# Patient Record
Sex: MALE | Race: WHITE
[De-identification: names, ages, dates, MRNs, and addresses within clinical notes are randomized per-mention and may not be internally consistent; named-entity substitution may affect disease eponyms.]

---

## 2024-09-13 ENCOUNTER — HOSPITAL ENCOUNTER (OUTPATIENT)
Dept: HOSPITAL 101 - LAB | Age: 69
LOS: 109 days | Discharge: HOME | End: 2024-12-31
Payer: MEDICARE

## 2024-09-13 DIAGNOSIS — I60.9: Primary | ICD-10-CM

## 2024-09-13 DIAGNOSIS — R41.82: ICD-10-CM

## 2024-09-13 LAB
ADD MANUAL DIFF: NO
ALANINE AMINOTRANSFERASE: 26 U/L (ref 16–63)
ALBUMIN GLOBULIN RATIO: 1.1
ALBUMIN LEVEL: 3.4 G/DL (ref 3.4–5)
ALKALINE PHOSPHATASE: 107 U/L (ref 46–116)
ANION GAP: 13.1
ASPARTATE AMINO TRANSFERASE: 12 U/L (ref 15–37)
BLOOD UREA NITROGEN: 15 MG/DL (ref 7–18)
CALCIUM: 9 MG/DL (ref 8.5–10.1)
CARBON DIOXIDE: 27.6 MMOL/L (ref 21–32)
CHLORIDE: 98 MMOL/L (ref 98–107)
CO2 BLD-SCNC: 27.6 MMOL/L (ref 21–32)
ESTIMATED GFR (AFRICAN AMERICA: >60 (ref 60–?)
ESTIMATED GFR (NON-AFRICAN AME: >60 (ref 60–?)
GLOBULIN: 3.1 G/DL
GLUCOSE BLD-MCNC: 186 MG/DL (ref 74–106)
HCT VFR BLD CALC: 35.6 % (ref 42–54)
HEMATOCRIT: 35.6 % (ref 42–54)
HEMOGLOBIN: 11.6 G/DL (ref 14–18)
IMMATURE GRANULOCYTES ABS AUTO: 0.01 10^3/UL (ref 0–0.03)
IMMATURE GRANULOCYTES PCT AUTO: 0.2 % (ref 0–0.5)
LYMPHOCYTES  ABSOLUTE AUTO: 1.1 10^3/UL (ref 1.2–3.8)
MCV RBC: 80.5 FL (ref 80–94)
MEAN CORPUSCULAR HEMOGLOBIN: 26.2 PG (ref 25.9–34)
MEAN CORPUSCULAR HGB CONC: 32.6 G/DL (ref 29.9–35.2)
MEAN CORPUSCULAR VOLUME: 80.5 FL (ref 80–94)
PLATELET # BLD: 252 10^3/UL (ref 150–450)
PLATELET COUNT: 252 10^3/UL (ref 150–450)
POTASSIUM SERPLBLD-SCNC: 3.7 MMOL/L (ref 3.5–5.1)
POTASSIUM: 3.7 MMOL/L (ref 3.5–5.1)
RED BLOOD COUNT: 4.42 10^6/UL (ref 4.7–6.1)
SODIUM BLD-SCNC: 135 MMOL/L (ref 136–145)
SODIUM: 135 MMOL/L (ref 136–145)
TOTAL PROTEIN: 6.5 G/DL (ref 6.4–8.2)
WBC # BLD: 4.9 10^3/UL (ref 4–11)
WHITE BLOOD COUNT: 4.9 10^3/UL (ref 4–11)

## 2024-09-13 PROCEDURE — 80053 COMPREHEN METABOLIC PANEL: CPT

## 2024-09-13 PROCEDURE — 85025 COMPLETE CBC W/AUTO DIFF WBC: CPT

## 2024-09-13 PROCEDURE — 36415 COLL VENOUS BLD VENIPUNCTURE: CPT

## 2024-09-18 ENCOUNTER — HOSPITAL ENCOUNTER
Dept: HOSPITAL 101 - CARD | Age: 69
Discharge: HOME | End: 2024-09-18
Payer: MEDICARE

## 2024-09-18 DIAGNOSIS — R55: Primary | ICD-10-CM

## 2024-09-18 PROCEDURE — 93246 EXT ECG>7D<15D RECORDING: CPT

## 2024-10-17 ENCOUNTER — HOSPITAL ENCOUNTER
Dept: HOSPITAL 101 - CARD | Age: 69
Discharge: HOME | End: 2024-10-17
Payer: MEDICARE

## 2024-10-17 DIAGNOSIS — R01.1: ICD-10-CM

## 2024-10-17 DIAGNOSIS — I47.29: Primary | ICD-10-CM

## 2024-10-17 DIAGNOSIS — I63.9: ICD-10-CM

## 2024-10-17 PROCEDURE — 93306 TTE W/DOPPLER COMPLETE: CPT

## 2024-12-03 ENCOUNTER — HOSPITAL ENCOUNTER (OUTPATIENT)
Dept: HOSPITAL 101 - ER | Age: 69
Setting detail: OBSERVATION
LOS: 2 days | Discharge: NURSING, REHAB OR LONG-TERM CARE | End: 2024-12-05
Payer: MEDICARE

## 2024-12-03 VITALS
OXYGEN SATURATION: 97 % | SYSTOLIC BLOOD PRESSURE: 138 MMHG | DIASTOLIC BLOOD PRESSURE: 76 MMHG | HEART RATE: 73 BPM | TEMPERATURE: 97.9 F

## 2024-12-03 VITALS — BODY MASS INDEX: 28.2 KG/M2 | BODY MASS INDEX: 25.8 KG/M2

## 2024-12-03 DIAGNOSIS — Z79.4: ICD-10-CM

## 2024-12-03 DIAGNOSIS — Z74.01: ICD-10-CM

## 2024-12-03 DIAGNOSIS — Z79.82: ICD-10-CM

## 2024-12-03 DIAGNOSIS — Z87.891: ICD-10-CM

## 2024-12-03 DIAGNOSIS — I10: ICD-10-CM

## 2024-12-03 DIAGNOSIS — Z96.0: ICD-10-CM

## 2024-12-03 DIAGNOSIS — Z79.899: ICD-10-CM

## 2024-12-03 DIAGNOSIS — R31.0: Primary | ICD-10-CM

## 2024-12-03 DIAGNOSIS — E78.5: ICD-10-CM

## 2024-12-03 DIAGNOSIS — G81.94: ICD-10-CM

## 2024-12-03 DIAGNOSIS — N40.1: ICD-10-CM

## 2024-12-03 DIAGNOSIS — R33.8: ICD-10-CM

## 2024-12-03 DIAGNOSIS — I66.09: ICD-10-CM

## 2024-12-03 DIAGNOSIS — E11.9: ICD-10-CM

## 2024-12-03 PROCEDURE — 96366 THER/PROPH/DIAG IV INF ADDON: CPT

## 2024-12-03 PROCEDURE — 74176 CT ABD & PELVIS W/O CONTRAST: CPT

## 2024-12-03 PROCEDURE — 96367 TX/PROPH/DG ADDL SEQ IV INF: CPT

## 2024-12-03 PROCEDURE — 85610 PROTHROMBIN TIME: CPT

## 2024-12-03 PROCEDURE — 85025 COMPLETE CBC W/AUTO DIFF WBC: CPT

## 2024-12-03 PROCEDURE — G0378 HOSPITAL OBSERVATION PER HR: HCPCS

## 2024-12-03 PROCEDURE — 86850 RBC ANTIBODY SCREEN: CPT

## 2024-12-03 PROCEDURE — 96365 THER/PROPH/DIAG IV INF INIT: CPT

## 2024-12-03 PROCEDURE — 99285 EMERGENCY DEPT VISIT HI MDM: CPT

## 2024-12-03 PROCEDURE — 36415 COLL VENOUS BLD VENIPUNCTURE: CPT

## 2024-12-03 PROCEDURE — 94761 N-INVAS EAR/PLS OXIMETRY MLT: CPT

## 2024-12-03 PROCEDURE — 82948 REAGENT STRIP/BLOOD GLUCOSE: CPT

## 2024-12-03 PROCEDURE — 80053 COMPREHEN METABOLIC PANEL: CPT

## 2024-12-03 PROCEDURE — 96376 TX/PRO/DX INJ SAME DRUG ADON: CPT

## 2024-12-03 PROCEDURE — 86901 BLOOD TYPING SEROLOGIC RH(D): CPT

## 2024-12-03 PROCEDURE — 85014 HEMATOCRIT: CPT

## 2024-12-03 PROCEDURE — 86900 BLOOD TYPING SEROLOGIC ABO: CPT

## 2024-12-03 PROCEDURE — 51702 INSERT TEMP BLADDER CATH: CPT

## 2024-12-03 PROCEDURE — 85018 HEMOGLOBIN: CPT

## 2024-12-03 RX ADMIN — SODIUM CHLORIDE 3000 ML: 900 IRRIGANT IRRIGATION at 23:53

## 2024-12-04 VITALS — HEART RATE: 78 BPM

## 2024-12-04 VITALS — SYSTOLIC BLOOD PRESSURE: 127 MMHG | DIASTOLIC BLOOD PRESSURE: 67 MMHG

## 2024-12-04 VITALS
OXYGEN SATURATION: 95 % | TEMPERATURE: 97.34 F | SYSTOLIC BLOOD PRESSURE: 120 MMHG | DIASTOLIC BLOOD PRESSURE: 71 MMHG | HEART RATE: 89 BPM

## 2024-12-04 VITALS — OXYGEN SATURATION: 97 %

## 2024-12-04 VITALS — OXYGEN SATURATION: 96 %

## 2024-12-04 VITALS
OXYGEN SATURATION: 92 % | SYSTOLIC BLOOD PRESSURE: 155 MMHG | HEART RATE: 70 BPM | TEMPERATURE: 98.1 F | DIASTOLIC BLOOD PRESSURE: 85 MMHG

## 2024-12-04 VITALS — SYSTOLIC BLOOD PRESSURE: 105 MMHG | DIASTOLIC BLOOD PRESSURE: 53 MMHG

## 2024-12-04 VITALS — SYSTOLIC BLOOD PRESSURE: 112 MMHG | DIASTOLIC BLOOD PRESSURE: 57 MMHG

## 2024-12-04 VITALS — DIASTOLIC BLOOD PRESSURE: 68 MMHG | SYSTOLIC BLOOD PRESSURE: 127 MMHG

## 2024-12-04 VITALS — DIASTOLIC BLOOD PRESSURE: 83 MMHG | SYSTOLIC BLOOD PRESSURE: 159 MMHG

## 2024-12-04 VITALS — SYSTOLIC BLOOD PRESSURE: 112 MMHG | DIASTOLIC BLOOD PRESSURE: 53 MMHG

## 2024-12-04 VITALS — HEART RATE: 91 BPM

## 2024-12-04 VITALS — DIASTOLIC BLOOD PRESSURE: 77 MMHG | SYSTOLIC BLOOD PRESSURE: 142 MMHG

## 2024-12-04 VITALS
DIASTOLIC BLOOD PRESSURE: 85 MMHG | HEART RATE: 70 BPM | TEMPERATURE: 98.1 F | OXYGEN SATURATION: 92 % | SYSTOLIC BLOOD PRESSURE: 155 MMHG

## 2024-12-04 VITALS — OXYGEN SATURATION: 95 %

## 2024-12-04 VITALS — DIASTOLIC BLOOD PRESSURE: 62 MMHG | SYSTOLIC BLOOD PRESSURE: 115 MMHG

## 2024-12-04 VITALS — DIASTOLIC BLOOD PRESSURE: 65 MMHG | SYSTOLIC BLOOD PRESSURE: 124 MMHG

## 2024-12-04 VITALS — OXYGEN SATURATION: 98 %

## 2024-12-04 VITALS — OXYGEN SATURATION: 93 %

## 2024-12-04 VITALS — SYSTOLIC BLOOD PRESSURE: 105 MMHG | DIASTOLIC BLOOD PRESSURE: 55 MMHG

## 2024-12-04 VITALS
TEMPERATURE: 99.14 F | DIASTOLIC BLOOD PRESSURE: 86 MMHG | SYSTOLIC BLOOD PRESSURE: 135 MMHG | OXYGEN SATURATION: 90 % | HEART RATE: 83 BPM

## 2024-12-04 VITALS — SYSTOLIC BLOOD PRESSURE: 102 MMHG | DIASTOLIC BLOOD PRESSURE: 60 MMHG

## 2024-12-04 VITALS — SYSTOLIC BLOOD PRESSURE: 108 MMHG | DIASTOLIC BLOOD PRESSURE: 57 MMHG

## 2024-12-04 VITALS — HEART RATE: 100 BPM

## 2024-12-04 VITALS — OXYGEN SATURATION: 94 %

## 2024-12-04 VITALS — DIASTOLIC BLOOD PRESSURE: 75 MMHG | SYSTOLIC BLOOD PRESSURE: 113 MMHG | HEART RATE: 88 BPM | OXYGEN SATURATION: 97 %

## 2024-12-04 VITALS — SYSTOLIC BLOOD PRESSURE: 119 MMHG | DIASTOLIC BLOOD PRESSURE: 72 MMHG

## 2024-12-04 VITALS — OXYGEN SATURATION: 99 %

## 2024-12-04 VITALS — HEART RATE: 102 BPM

## 2024-12-04 LAB
ADD MANUAL DIFF: NO
ALANINE AMINOTRANSFERASE: 18 U/L (ref 16–63)
ALBUMIN GLOBULIN RATIO: 1.2
ALBUMIN LEVEL: 3.6 G/DL (ref 3.4–5)
ALKALINE PHOSPHATASE: 74 U/L (ref 46–116)
ANION GAP: 11.7
ASPARTATE AMINO TRANSFERASE: 9 U/L (ref 15–37)
BLOOD UREA NITROGEN: 23 MG/DL (ref 7–18)
CALCIUM: 9.6 MG/DL (ref 8.5–10.1)
CARBON DIOXIDE: 29.6 MMOL/L (ref 21–32)
CHLORIDE: 101 MMOL/L (ref 98–107)
CO2 BLD-SCNC: 29.6 MMOL/L (ref 21–32)
ESTIMATED GFR (AFRICAN AMERICA: >60
ESTIMATED GFR (NON-AFRICAN AME: >60
GLOBULIN: 3.1 G/DL
GLUCOSE BLD-MCNC: 160 MG/DL (ref 74–106)
HCT VFR BLD CALC: 36.2 % (ref 42–54)
HCT VFR BLD CALC: 36.6 % (ref 42–54)
HEMATOCRIT: 36.2 % (ref 42–54)
HEMATOCRIT: 36.6 % (ref 42–54)
HEMOGLOBIN: 12 G/DL (ref 14–18)
HEMOGLOBIN: 12 G/DL (ref 14–18)
IMMATURE GRANULOCYTES ABS AUTO: 0.02 10^3/UL (ref 0–0.03)
IMMATURE GRANULOCYTES PCT AUTO: 0.3 % (ref 0–0.5)
INR: 1.09
LYMPHOCYTES  ABSOLUTE AUTO: 1.2 10^3/UL (ref 1.2–3.8)
MCV RBC: 83.8 FL (ref 80–94)
MEAN CORPUSCULAR HEMOGLOBIN: 27.8 PG (ref 25.9–34)
MEAN CORPUSCULAR HGB CONC: 33.1 G/DL (ref 29.9–35.2)
MEAN CORPUSCULAR VOLUME: 83.8 FL (ref 80–94)
PLATELET # BLD: 245 10^3/UL (ref 150–450)
PLATELET COUNT: 245 10^3/UL (ref 150–450)
POTASSIUM SERPLBLD-SCNC: 4.3 MMOL/L (ref 3.5–5.1)
POTASSIUM: 4.3 MMOL/L (ref 3.5–5.1)
PROTHROMBIN TIME: 11.5 SEC (ref 9–11.6)
RED BLOOD COUNT: 4.32 10^6/UL (ref 4.7–6.1)
SODIUM BLD-SCNC: 138 MMOL/L (ref 136–145)
SODIUM: 138 MMOL/L (ref 136–145)
TOTAL PROTEIN: 6.7 G/DL (ref 6.4–8.2)
WBC # BLD: 6.7 10^3/UL (ref 4–11)
WHITE BLOOD COUNT: 6.7 10^3/UL (ref 4–11)

## 2024-12-04 RX ADMIN — SODIUM CHLORIDE 3000 ML: 900 IRRIGANT IRRIGATION at 15:26

## 2024-12-04 RX ADMIN — GABAPENTIN 100 MG: 100 CAPSULE ORAL at 21:20

## 2024-12-04 RX ADMIN — TAMSULOSIN HYDROCHLORIDE 0.4 MG: 0.4 CAPSULE ORAL at 21:18

## 2024-12-04 RX ADMIN — SODIUM CHLORIDE 3000 ML: 900 IRRIGANT IRRIGATION at 09:00

## 2024-12-04 RX ADMIN — SODIUM CHLORIDE 3000 ML: 900 IRRIGANT IRRIGATION at 12:32

## 2024-12-04 RX ADMIN — INSULIN ASPART 0 UNIT: 100 INJECTION, SOLUTION INTRAVENOUS; SUBCUTANEOUS at 21:39

## 2024-12-04 RX ADMIN — LEVOFLOXACIN 100 MG: 5 INJECTION, SOLUTION INTRAVENOUS at 12:38

## 2024-12-04 RX ADMIN — SODIUM CHLORIDE 3000 ML: 900 IRRIGANT IRRIGATION at 17:19

## 2024-12-04 RX ADMIN — TEMAZEPAM 15 MG: 15 CAPSULE ORAL at 21:21

## 2024-12-04 RX ADMIN — SODIUM CHLORIDE 3000 ML: 900 IRRIGANT IRRIGATION at 01:45

## 2024-12-04 RX ADMIN — GABAPENTIN 100 MG: 100 CAPSULE ORAL at 14:24

## 2024-12-04 RX ADMIN — SODIUM CHLORIDE 3000 ML: 900 IRRIGANT IRRIGATION at 05:31

## 2024-12-04 RX ADMIN — SODIUM CHLORIDE 3000 ML: 900 IRRIGANT IRRIGATION at 15:22

## 2024-12-04 RX ADMIN — HYDROCODONE BITARTRATE AND ACETAMINOPHEN 1 TAB: 5; 325 TABLET ORAL at 01:45

## 2024-12-04 RX ADMIN — SODIUM CHLORIDE 3000 ML: 900 IRRIGANT IRRIGATION at 14:34

## 2024-12-04 RX ADMIN — SODIUM CHLORIDE 3000 ML: 900 IRRIGANT IRRIGATION at 11:27

## 2024-12-04 RX ADMIN — ACETAMINOPHEN 650 MG: 325 TABLET ORAL at 11:02

## 2024-12-04 RX ADMIN — LIDOCAINE HYDROCHLORIDE 10 ML: 20 JELLY TOPICAL at 09:21

## 2024-12-04 RX ADMIN — SODIUM CHLORIDE 3000 ML: 900 IRRIGANT IRRIGATION at 22:33

## 2024-12-04 RX ADMIN — SODIUM CHLORIDE 3000 ML: 900 IRRIGANT IRRIGATION at 00:35

## 2024-12-04 RX ADMIN — INSULIN GLARGINE 15 UNIT: 100 INJECTION, SOLUTION SUBCUTANEOUS at 21:39

## 2024-12-04 RX ADMIN — SODIUM CHLORIDE 3000 ML: 900 IRRIGANT IRRIGATION at 03:00

## 2024-12-04 RX ADMIN — SODIUM CHLORIDE 3000 ML: 900 IRRIGANT IRRIGATION at 16:19

## 2024-12-04 RX ADMIN — SODIUM CHLORIDE 3000 ML: 900 IRRIGANT IRRIGATION at 04:41

## 2024-12-04 RX ADMIN — TRAZODONE HYDROCHLORIDE 25 MG: 50 TABLET ORAL at 21:18

## 2024-12-04 RX ADMIN — SENNOSIDES, DOCUSATE SODIUM 17.2 MG: 8.6; 5 TABLET ORAL at 21:19

## 2024-12-04 RX ADMIN — SODIUM CHLORIDE 3000 ML: 900 IRRIGANT IRRIGATION at 03:38

## 2024-12-04 RX ADMIN — SODIUM CHLORIDE 3000 ML: 900 IRRIGANT IRRIGATION at 08:15

## 2024-12-04 RX ADMIN — ONDANSETRON 4 MG: 4 TABLET, ORALLY DISINTEGRATING ORAL at 01:45

## 2024-12-04 RX ADMIN — SODIUM CHLORIDE 3000 ML: 900 IRRIGANT IRRIGATION at 21:56

## 2024-12-04 RX ADMIN — SODIUM CHLORIDE 3000 ML: 900 IRRIGANT IRRIGATION at 14:23

## 2024-12-04 RX ADMIN — ATORVASTATIN CALCIUM 40 MG: 40 TABLET, FILM COATED ORAL at 21:11

## 2024-12-05 VITALS
OXYGEN SATURATION: 93 % | SYSTOLIC BLOOD PRESSURE: 111 MMHG | HEART RATE: 77 BPM | DIASTOLIC BLOOD PRESSURE: 73 MMHG | TEMPERATURE: 97.6 F

## 2024-12-05 VITALS — OXYGEN SATURATION: 98 %

## 2024-12-05 VITALS
HEART RATE: 92 BPM | TEMPERATURE: 97.7 F | OXYGEN SATURATION: 90 % | SYSTOLIC BLOOD PRESSURE: 115 MMHG | DIASTOLIC BLOOD PRESSURE: 66 MMHG

## 2024-12-05 VITALS — HEART RATE: 61 BPM

## 2024-12-05 VITALS
SYSTOLIC BLOOD PRESSURE: 109 MMHG | TEMPERATURE: 97.88 F | DIASTOLIC BLOOD PRESSURE: 69 MMHG | OXYGEN SATURATION: 91 % | HEART RATE: 76 BPM

## 2024-12-05 VITALS — HEART RATE: 84 BPM

## 2024-12-05 VITALS — HEART RATE: 88 BPM

## 2024-12-05 VITALS — HEART RATE: 81 BPM

## 2024-12-05 VITALS — OXYGEN SATURATION: 95 %

## 2024-12-05 VITALS — HEART RATE: 74 BPM

## 2024-12-05 VITALS — HEART RATE: 71 BPM

## 2024-12-05 LAB
ADD MANUAL DIFF: NO
ALANINE AMINOTRANSFERASE: 15 U/L (ref 16–63)
ALBUMIN GLOBULIN RATIO: 1.2
ALBUMIN LEVEL: 3.3 G/DL (ref 3.4–5)
ALKALINE PHOSPHATASE: 72 U/L (ref 46–116)
ANION GAP: 12.6
ASPARTATE AMINO TRANSFERASE: 6 U/L (ref 15–37)
BLOOD UREA NITROGEN: 22 MG/DL (ref 7–18)
CALCIUM: 8.8 MG/DL (ref 8.5–10.1)
CARBON DIOXIDE: 27.6 MMOL/L (ref 21–32)
CHLORIDE: 105 MMOL/L (ref 98–107)
CO2 BLD-SCNC: 27.6 MMOL/L (ref 21–32)
ESTIMATED GFR (AFRICAN AMERICA: >60
ESTIMATED GFR (NON-AFRICAN AME: >60
GLOBULIN: 2.7 G/DL
GLUCOSE BLD-MCNC: 133 MG/DL (ref 74–106)
HCT VFR BLD CALC: 32.7 % (ref 42–54)
HEMATOCRIT: 32.7 % (ref 42–54)
HEMOGLOBIN: 10.8 G/DL (ref 14–18)
IMMATURE GRANULOCYTES ABS AUTO: 0.01 10^3/UL (ref 0–0.03)
IMMATURE GRANULOCYTES PCT AUTO: 0.2 % (ref 0–0.5)
LYMPHOCYTES  ABSOLUTE AUTO: 1.7 10^3/UL (ref 1.2–3.8)
MCV RBC: 84.7 FL (ref 80–94)
MEAN CORPUSCULAR HEMOGLOBIN: 28 PG (ref 25.9–34)
MEAN CORPUSCULAR HGB CONC: 33 G/DL (ref 29.9–35.2)
MEAN CORPUSCULAR VOLUME: 84.7 FL (ref 80–94)
PLATELET # BLD: 245 10^3/UL (ref 150–450)
PLATELET COUNT: 245 10^3/UL (ref 150–450)
POTASSIUM SERPLBLD-SCNC: 4.2 MMOL/L (ref 3.5–5.1)
POTASSIUM: 4.2 MMOL/L (ref 3.5–5.1)
RED BLOOD COUNT: 3.86 10^6/UL (ref 4.7–6.1)
SODIUM BLD-SCNC: 141 MMOL/L (ref 136–145)
SODIUM: 141 MMOL/L (ref 136–145)
TOTAL PROTEIN: 6 G/DL (ref 6.4–8.2)
WBC # BLD: 5 10^3/UL (ref 4–11)
WHITE BLOOD COUNT: 5 10^3/UL (ref 4–11)

## 2024-12-05 RX ADMIN — POLYETHYLENE GLYCOL 3350 17 GM: 17 POWDER, FOR SOLUTION ORAL at 10:48

## 2024-12-05 RX ADMIN — SODIUM CHLORIDE 3000 ML: 900 IRRIGANT IRRIGATION at 01:31

## 2024-12-05 RX ADMIN — GABAPENTIN 100 MG: 100 CAPSULE ORAL at 14:56

## 2024-12-05 RX ADMIN — Medication 400 MG: at 10:47

## 2024-12-05 RX ADMIN — ACETAMINOPHEN 650 MG: 325 TABLET ORAL at 12:09

## 2024-12-05 RX ADMIN — SODIUM CHLORIDE 3000 ML: 900 IRRIGANT IRRIGATION at 01:24

## 2024-12-05 RX ADMIN — INSULIN ASPART 0 UNIT: 100 INJECTION, SOLUTION INTRAVENOUS; SUBCUTANEOUS at 18:38

## 2024-12-05 RX ADMIN — METOPROLOL SUCCINATE 50 MG: 50 TABLET, EXTENDED RELEASE ORAL at 10:47

## 2024-12-05 RX ADMIN — INSULIN ASPART 0 UNIT: 100 INJECTION, SOLUTION INTRAVENOUS; SUBCUTANEOUS at 12:10

## 2024-12-05 RX ADMIN — SENNOSIDES, DOCUSATE SODIUM 1 TAB: 8.6; 5 TABLET ORAL at 10:48

## 2024-12-05 RX ADMIN — ACETAMINOPHEN 650 MG: 325 TABLET ORAL at 18:38

## 2024-12-05 RX ADMIN — SODIUM CHLORIDE 3000 ML: 900 IRRIGANT IRRIGATION at 07:58

## 2024-12-06 ENCOUNTER — HOSPITAL ENCOUNTER (EMERGENCY)
Age: 69
LOS: 1 days | Discharge: ANOTHER HOSPITAL | End: 2024-12-07
Payer: MEDICARE

## 2024-12-06 VITALS — HEART RATE: 76 BPM

## 2024-12-06 VITALS — HEART RATE: 70 BPM

## 2024-12-06 VITALS — HEART RATE: 72 BPM

## 2024-12-06 VITALS — SYSTOLIC BLOOD PRESSURE: 128 MMHG | DIASTOLIC BLOOD PRESSURE: 70 MMHG

## 2024-12-06 VITALS — SYSTOLIC BLOOD PRESSURE: 152 MMHG | HEART RATE: 67 BPM | DIASTOLIC BLOOD PRESSURE: 61 MMHG

## 2024-12-06 VITALS — HEART RATE: 67 BPM

## 2024-12-06 VITALS — HEART RATE: 71 BPM

## 2024-12-06 VITALS — HEART RATE: 77 BPM

## 2024-12-06 VITALS — HEART RATE: 74 BPM

## 2024-12-06 VITALS
DIASTOLIC BLOOD PRESSURE: 70 MMHG | SYSTOLIC BLOOD PRESSURE: 128 MMHG | HEART RATE: 63 BPM | OXYGEN SATURATION: 97 % | TEMPERATURE: 98.24 F

## 2024-12-06 VITALS — DIASTOLIC BLOOD PRESSURE: 63 MMHG | SYSTOLIC BLOOD PRESSURE: 132 MMHG | HEART RATE: 67 BPM

## 2024-12-06 VITALS — HEART RATE: 65 BPM

## 2024-12-06 VITALS — HEART RATE: 64 BPM

## 2024-12-06 VITALS — BODY MASS INDEX: 27.1 KG/M2

## 2024-12-06 VITALS — HEART RATE: 73 BPM

## 2024-12-06 VITALS — HEART RATE: 61 BPM

## 2024-12-06 DIAGNOSIS — M54.89: ICD-10-CM

## 2024-12-06 DIAGNOSIS — Z96.652: ICD-10-CM

## 2024-12-06 DIAGNOSIS — Z86.73: ICD-10-CM

## 2024-12-06 DIAGNOSIS — Z87.891: ICD-10-CM

## 2024-12-06 DIAGNOSIS — R31.0: Primary | ICD-10-CM

## 2024-12-06 LAB
ADD MANUAL DIFF: NO
ANION GAP: 11.3
BLOOD UREA NITROGEN: 23 MG/DL (ref 7–18)
CALCIUM: 9.2 MG/DL (ref 8.5–10.1)
CARBON DIOXIDE: 29.7 MMOL/L (ref 21–32)
CHLORIDE: 102 MMOL/L (ref 98–107)
CO2 BLD-SCNC: 29.7 MMOL/L (ref 21–32)
ESTIMATED GFR (AFRICAN AMERICA: >60
ESTIMATED GFR (NON-AFRICAN AME: >60
GLUCOSE BLD-MCNC: 167 MG/DL (ref 74–106)
HCT VFR BLD CALC: 33.4 % (ref 42–54)
HEMATOCRIT: 33.4 % (ref 42–54)
HEMOGLOBIN: 10.8 G/DL (ref 14–18)
IMMATURE GRANULOCYTES ABS AUTO: 0.01 10^3/UL (ref 0–0.03)
IMMATURE GRANULOCYTES PCT AUTO: 0.2 % (ref 0–0.5)
LYMPHOCYTES  ABSOLUTE AUTO: 1.8 10^3/UL (ref 1.2–3.8)
MCV RBC: 85 FL (ref 80–94)
MEAN CORPUSCULAR HEMOGLOBIN: 27.5 PG (ref 25.9–34)
MEAN CORPUSCULAR HGB CONC: 32.3 G/DL (ref 29.9–35.2)
MEAN CORPUSCULAR VOLUME: 85 FL (ref 80–94)
PLATELET # BLD: 264 10^3/UL (ref 150–450)
PLATELET COUNT: 264 10^3/UL (ref 150–450)
POTASSIUM SERPLBLD-SCNC: 4 MMOL/L (ref 3.5–5.1)
POTASSIUM: 4 MMOL/L (ref 3.5–5.1)
RED BLOOD COUNT: 3.93 10^6/UL (ref 4.7–6.1)
SODIUM BLD-SCNC: 139 MMOL/L (ref 136–145)
SODIUM: 139 MMOL/L (ref 136–145)
WBC # BLD: 5 10^3/UL (ref 4–11)
WHITE BLOOD COUNT: 5 10^3/UL (ref 4–11)

## 2024-12-06 PROCEDURE — 36415 COLL VENOUS BLD VENIPUNCTURE: CPT

## 2024-12-06 PROCEDURE — 93005 ELECTROCARDIOGRAM TRACING: CPT

## 2024-12-06 PROCEDURE — 84484 ASSAY OF TROPONIN QUANT: CPT

## 2024-12-06 PROCEDURE — 99285 EMERGENCY DEPT VISIT HI MDM: CPT

## 2024-12-06 PROCEDURE — 85025 COMPLETE CBC W/AUTO DIFF WBC: CPT

## 2024-12-06 PROCEDURE — 80048 BASIC METABOLIC PNL TOTAL CA: CPT

## 2024-12-06 PROCEDURE — 96374 THER/PROPH/DIAG INJ IV PUSH: CPT

## 2024-12-07 ENCOUNTER — HOSPITAL ENCOUNTER (INPATIENT)
Facility: HOSPITAL | Age: 69
End: 2024-12-07
Attending: INTERNAL MEDICINE | Admitting: INTERNAL MEDICINE

## 2024-12-07 VITALS — HEART RATE: 71 BPM

## 2024-12-07 VITALS — HEART RATE: 70 BPM

## 2024-12-07 VITALS — HEART RATE: 62 BPM

## 2024-12-07 VITALS — HEART RATE: 64 BPM

## 2024-12-07 VITALS — HEART RATE: 65 BPM

## 2024-12-07 VITALS — HEART RATE: 67 BPM

## 2024-12-07 VITALS — HEART RATE: 73 BPM

## 2024-12-07 VITALS — HEART RATE: 84 BPM

## 2024-12-07 DIAGNOSIS — R31.9 HEMATURIA: Primary | ICD-10-CM

## 2024-12-07 DIAGNOSIS — Z72.0 TOBACCO USE: ICD-10-CM

## 2024-12-07 DIAGNOSIS — Z97.8 CHRONIC INDWELLING FOLEY CATHETER: ICD-10-CM

## 2024-12-07 PROBLEM — E87.1 HYPONATREMIA: Status: ACTIVE | Noted: 2024-12-07

## 2024-12-07 PROBLEM — K59.00 CONSTIPATION: Status: ACTIVE | Noted: 2024-12-07

## 2024-12-07 LAB
ALBUMIN SERPL BCP-MCNC: 4 G/DL (ref 3.4–5)
ALP SERPL-CCNC: 60 U/L (ref 33–136)
ALT SERPL W P-5'-P-CCNC: 11 U/L (ref 10–52)
ANION GAP SERPL CALC-SCNC: 12 MMOL/L (ref 10–20)
APPEARANCE UR: CLEAR
APTT PPP: 32 SECONDS (ref 27–38)
AST SERPL W P-5'-P-CCNC: 8 U/L (ref 9–39)
ATRIAL RATE: 55 BPM
BILIRUB SERPL-MCNC: 0.5 MG/DL (ref 0–1.2)
BILIRUB UR STRIP.AUTO-MCNC: NEGATIVE MG/DL
BUN SERPL-MCNC: 20 MG/DL (ref 6–23)
CALCIUM SERPL-MCNC: 9.2 MG/DL (ref 8.6–10.3)
CHLORIDE SERPL-SCNC: 100 MMOL/L (ref 98–107)
CO2 SERPL-SCNC: 27 MMOL/L (ref 21–32)
COLOR UR: COLORLESS
CREAT SERPL-MCNC: 0.46 MG/DL (ref 0.5–1.3)
EGFRCR SERPLBLD CKD-EPI 2021: >90 ML/MIN/1.73M*2
ERYTHROCYTE [DISTWIDTH] IN BLOOD BY AUTOMATED COUNT: 16.2 % (ref 11.5–14.5)
GLUCOSE BLD MANUAL STRIP-MCNC: 160 MG/DL (ref 74–99)
GLUCOSE BLD MANUAL STRIP-MCNC: 173 MG/DL (ref 74–99)
GLUCOSE BLD MANUAL STRIP-MCNC: 179 MG/DL (ref 74–99)
GLUCOSE BLD MANUAL STRIP-MCNC: 188 MG/DL (ref 74–99)
GLUCOSE BLD MANUAL STRIP-MCNC: 192 MG/DL (ref 74–99)
GLUCOSE SERPL-MCNC: 181 MG/DL (ref 74–99)
GLUCOSE UR STRIP.AUTO-MCNC: NORMAL MG/DL
HCT VFR BLD AUTO: 35.2 % (ref 41–52)
HGB BLD-MCNC: 10.8 G/DL (ref 13.5–17.5)
INR PPP: 1.2 (ref 0.9–1.1)
KETONES UR STRIP.AUTO-MCNC: NEGATIVE MG/DL
LEUKOCYTE ESTERASE UR QL STRIP.AUTO: NEGATIVE
MAGNESIUM SERPL-MCNC: 1.69 MG/DL (ref 1.6–2.4)
MCH RBC QN AUTO: 27 PG (ref 26–34)
MCHC RBC AUTO-ENTMCNC: 30.7 G/DL (ref 32–36)
MCV RBC AUTO: 88 FL (ref 80–100)
NITRITE UR QL STRIP.AUTO: NEGATIVE
NRBC BLD-RTO: 0 /100 WBCS (ref 0–0)
P AXIS: 13 DEGREES
P OFFSET: 203 MS
P ONSET: 141 MS
PH UR STRIP.AUTO: 6.5 [PH]
PHOSPHATE SERPL-MCNC: 3.1 MG/DL (ref 2.5–4.9)
PLATELET # BLD AUTO: 250 X10*3/UL (ref 150–450)
POTASSIUM SERPL-SCNC: 4 MMOL/L (ref 3.5–5.3)
PR INTERVAL: 168 MS
PROT SERPL-MCNC: 6.4 G/DL (ref 6.4–8.2)
PROT UR STRIP.AUTO-MCNC: NEGATIVE MG/DL
PROTHROMBIN TIME: 13.1 SECONDS (ref 9.8–12.8)
Q ONSET: 225 MS
QRS COUNT: 9 BEATS
QRS DURATION: 106 MS
QT INTERVAL: 434 MS
QTC CALCULATION(BAZETT): 415 MS
QTC FREDERICIA: 421 MS
R AXIS: -25 DEGREES
RBC # BLD AUTO: 4 X10*6/UL (ref 4.5–5.9)
RBC # UR STRIP.AUTO: ABNORMAL /UL
RBC #/AREA URNS AUTO: NORMAL /HPF
SODIUM SERPL-SCNC: 135 MMOL/L (ref 136–145)
SP GR UR STRIP.AUTO: 1.01
T AXIS: -13 DEGREES
T OFFSET: 442 MS
UROBILINOGEN UR STRIP.AUTO-MCNC: NORMAL MG/DL
VENTRICULAR RATE: 55 BPM
WBC # BLD AUTO: 4.3 X10*3/UL (ref 4.4–11.3)
WBC #/AREA URNS AUTO: NORMAL /HPF

## 2024-12-07 PROCEDURE — 80053 COMPREHEN METABOLIC PANEL: CPT

## 2024-12-07 PROCEDURE — 2500000005 HC RX 250 GENERAL PHARMACY W/O HCPCS

## 2024-12-07 PROCEDURE — 2500000002 HC RX 250 W HCPCS SELF ADMINISTERED DRUGS (ALT 637 FOR MEDICARE OP, ALT 636 FOR OP/ED)

## 2024-12-07 PROCEDURE — 85027 COMPLETE CBC AUTOMATED: CPT

## 2024-12-07 PROCEDURE — S4991 NICOTINE PATCH NONLEGEND: HCPCS

## 2024-12-07 PROCEDURE — 74176 CT ABD & PELVIS W/O CONTRAST: CPT | Performed by: RADIOLOGY

## 2024-12-07 PROCEDURE — 84075 ASSAY ALKALINE PHOSPHATASE: CPT

## 2024-12-07 PROCEDURE — 99222 1ST HOSP IP/OBS MODERATE 55: CPT

## 2024-12-07 PROCEDURE — 2500000004 HC RX 250 GENERAL PHARMACY W/ HCPCS (ALT 636 FOR OP/ED)

## 2024-12-07 PROCEDURE — 84100 ASSAY OF PHOSPHORUS: CPT

## 2024-12-07 PROCEDURE — 1200000002 HC GENERAL ROOM WITH TELEMETRY DAILY

## 2024-12-07 PROCEDURE — 85610 PROTHROMBIN TIME: CPT

## 2024-12-07 PROCEDURE — 2500000001 HC RX 250 WO HCPCS SELF ADMINISTERED DRUGS (ALT 637 FOR MEDICARE OP)

## 2024-12-07 PROCEDURE — 81001 URINALYSIS AUTO W/SCOPE: CPT

## 2024-12-07 PROCEDURE — 36415 COLL VENOUS BLD VENIPUNCTURE: CPT

## 2024-12-07 PROCEDURE — 2500000005 HC RX 250 GENERAL PHARMACY W/O HCPCS: Performed by: PHYSICIAN ASSISTANT

## 2024-12-07 PROCEDURE — 82947 ASSAY GLUCOSE BLOOD QUANT: CPT

## 2024-12-07 PROCEDURE — 83735 ASSAY OF MAGNESIUM: CPT

## 2024-12-07 RX ORDER — METOPROLOL SUCCINATE 50 MG/1
50 TABLET, EXTENDED RELEASE ORAL DAILY
Status: ON HOLD | COMMUNITY

## 2024-12-07 RX ORDER — METOPROLOL SUCCINATE 50 MG/1
50 TABLET, EXTENDED RELEASE ORAL DAILY
OUTPATIENT
Start: 2024-12-07

## 2024-12-07 RX ORDER — LANOLIN ALCOHOL/MO/W.PET/CERES
400 CREAM (GRAM) TOPICAL DAILY
OUTPATIENT
Start: 2024-12-07

## 2024-12-07 RX ORDER — PANTOPRAZOLE SODIUM 40 MG/1
40 TABLET, DELAYED RELEASE ORAL
Status: DISPENSED | OUTPATIENT
Start: 2024-12-07

## 2024-12-07 RX ORDER — CALCIUM CARBONATE 300MG(750)
400 TABLET,CHEWABLE ORAL DAILY
Status: ON HOLD | COMMUNITY

## 2024-12-07 RX ORDER — GABAPENTIN 100 MG/1
100 CAPSULE ORAL 3 TIMES DAILY
OUTPATIENT
Start: 2024-12-07

## 2024-12-07 RX ORDER — TRAZODONE HYDROCHLORIDE 50 MG/1
25 TABLET ORAL NIGHTLY
Status: ON HOLD | COMMUNITY

## 2024-12-07 RX ORDER — SENNOSIDES 8.6 MG/1
2 TABLET ORAL DAILY
Status: DISPENSED | OUTPATIENT
Start: 2024-12-07

## 2024-12-07 RX ORDER — DEXTROSE 50 % IN WATER (D50W) INTRAVENOUS SYRINGE
12.5
Status: ACTIVE | OUTPATIENT
Start: 2024-12-07

## 2024-12-07 RX ORDER — TRAZODONE HYDROCHLORIDE 50 MG/1
25 TABLET ORAL NIGHTLY
OUTPATIENT
Start: 2024-12-07

## 2024-12-07 RX ORDER — NICOTINE 7MG/24HR
1 PATCH, TRANSDERMAL 24 HOURS TRANSDERMAL DAILY
Status: ACTIVE | OUTPATIENT
Start: 2025-02-01 | End: 2025-02-15

## 2024-12-07 RX ORDER — CEFUROXIME AXETIL 500 MG/1
500 TABLET ORAL 2 TIMES DAILY
Status: ON HOLD | COMMUNITY
Start: 2024-12-06 | End: 2024-12-12

## 2024-12-07 RX ORDER — BISACODYL 5 MG
10 TABLET, DELAYED RELEASE (ENTERIC COATED) ORAL DAILY
Status: DISPENSED | OUTPATIENT
Start: 2024-12-07

## 2024-12-07 RX ORDER — INSULIN LISPRO 100 [IU]/ML
INJECTION, SOLUTION INTRAVENOUS; SUBCUTANEOUS
Status: ON HOLD | COMMUNITY

## 2024-12-07 RX ORDER — POLYETHYLENE GLYCOL 3350 17 G/17G
17 POWDER, FOR SOLUTION ORAL DAILY
Status: ON HOLD | COMMUNITY

## 2024-12-07 RX ORDER — INSULIN GLARGINE 100 [IU]/ML
15 INJECTION, SOLUTION SUBCUTANEOUS NIGHTLY
Status: ON HOLD | COMMUNITY

## 2024-12-07 RX ORDER — TALC
3 POWDER (GRAM) TOPICAL NIGHTLY PRN
Status: DISPENSED | OUTPATIENT
Start: 2024-12-07

## 2024-12-07 RX ORDER — TAMSULOSIN HYDROCHLORIDE 0.4 MG/1
0.4 CAPSULE ORAL NIGHTLY
Status: ON HOLD | COMMUNITY

## 2024-12-07 RX ORDER — POLYETHYLENE GLYCOL 3350 17 G/17G
17 POWDER, FOR SOLUTION ORAL DAILY
Status: DISPENSED | OUTPATIENT
Start: 2024-12-07

## 2024-12-07 RX ORDER — CEFUROXIME AXETIL 250 MG/1
500 TABLET ORAL 2 TIMES DAILY
OUTPATIENT
Start: 2024-12-07

## 2024-12-07 RX ORDER — SENNOSIDES 8.6 MG/1
2 TABLET ORAL DAILY
Status: ON HOLD | COMMUNITY

## 2024-12-07 RX ORDER — ADHESIVE BANDAGE
5 BANDAGE TOPICAL DAILY PRN
OUTPATIENT
Start: 2024-12-07

## 2024-12-07 RX ORDER — ASPIRIN 81 MG/1
81 TABLET ORAL DAILY
OUTPATIENT
Start: 2024-12-07

## 2024-12-07 RX ORDER — TAMSULOSIN HYDROCHLORIDE 0.4 MG/1
0.4 CAPSULE ORAL DAILY
Status: DISPENSED | OUTPATIENT
Start: 2024-12-07

## 2024-12-07 RX ORDER — DEXTROSE 50 % IN WATER (D50W) INTRAVENOUS SYRINGE
25
Status: ACTIVE | OUTPATIENT
Start: 2024-12-07

## 2024-12-07 RX ORDER — GLYCINE 1.5 G/100ML
3000 IRRIGANT IRRIGATION CONTINUOUS
Status: ACTIVE | OUTPATIENT
Start: 2024-12-07

## 2024-12-07 RX ORDER — IBUPROFEN 200 MG
1 TABLET ORAL DAILY
Status: ACTIVE | OUTPATIENT
Start: 2025-01-18 | End: 2025-02-01

## 2024-12-07 RX ORDER — INSULIN GLARGINE 100 [IU]/ML
15 INJECTION, SOLUTION SUBCUTANEOUS NIGHTLY
OUTPATIENT
Start: 2024-12-07

## 2024-12-07 RX ORDER — INSULIN LISPRO 100 [IU]/ML
0-10 INJECTION, SOLUTION INTRAVENOUS; SUBCUTANEOUS
Status: DISPENSED | OUTPATIENT
Start: 2024-12-07

## 2024-12-07 RX ORDER — ATORVASTATIN CALCIUM 40 MG/1
40 TABLET, FILM COATED ORAL NIGHTLY
OUTPATIENT
Start: 2024-12-07

## 2024-12-07 RX ORDER — ACETAMINOPHEN 325 MG/1
650 TABLET ORAL EVERY 6 HOURS PRN
Status: DISPENSED | OUTPATIENT
Start: 2024-12-07

## 2024-12-07 RX ORDER — TALC
6 POWDER (GRAM) TOPICAL NIGHTLY
Status: ON HOLD | COMMUNITY

## 2024-12-07 RX ORDER — ASPIRIN 81 MG/1
81 TABLET ORAL DAILY
Status: ON HOLD | COMMUNITY

## 2024-12-07 RX ORDER — IBUPROFEN 200 MG
1 TABLET ORAL DAILY
Status: DISPENSED | OUTPATIENT
Start: 2024-12-07 | End: 2025-01-18

## 2024-12-07 RX ORDER — PANTOPRAZOLE SODIUM 40 MG/1
40 TABLET, DELAYED RELEASE ORAL
Status: ON HOLD | COMMUNITY

## 2024-12-07 RX ORDER — TAMSULOSIN HYDROCHLORIDE 0.4 MG/1
0.4 CAPSULE ORAL NIGHTLY
OUTPATIENT
Start: 2024-12-08

## 2024-12-07 RX ORDER — AMOXICILLIN 250 MG
1 CAPSULE ORAL NIGHTLY
Status: ON HOLD | COMMUNITY

## 2024-12-07 RX ORDER — ADHESIVE BANDAGE
5 BANDAGE TOPICAL DAILY PRN
Status: ON HOLD | COMMUNITY

## 2024-12-07 RX ORDER — GABAPENTIN 100 MG/1
100 CAPSULE ORAL 3 TIMES DAILY
Status: ON HOLD | COMMUNITY

## 2024-12-07 RX ORDER — ATORVASTATIN CALCIUM 40 MG/1
40 TABLET, FILM COATED ORAL NIGHTLY
Status: ON HOLD | COMMUNITY

## 2024-12-07 SDOH — SOCIAL STABILITY: SOCIAL INSECURITY: DO YOU FEEL ANYONE HAS EXPLOITED OR TAKEN ADVANTAGE OF YOU FINANCIALLY OR OF YOUR PERSONAL PROPERTY?: NO

## 2024-12-07 SDOH — SOCIAL STABILITY: SOCIAL INSECURITY: WITHIN THE LAST YEAR, HAVE YOU BEEN HUMILIATED OR EMOTIONALLY ABUSED IN OTHER WAYS BY YOUR PARTNER OR EX-PARTNER?: NO

## 2024-12-07 SDOH — SOCIAL STABILITY: SOCIAL INSECURITY: ARE THERE ANY APPARENT SIGNS OF INJURIES/BEHAVIORS THAT COULD BE RELATED TO ABUSE/NEGLECT?: NO

## 2024-12-07 SDOH — SOCIAL STABILITY: SOCIAL INSECURITY: WITHIN THE LAST YEAR, HAVE YOU BEEN AFRAID OF YOUR PARTNER OR EX-PARTNER?: NO

## 2024-12-07 SDOH — SOCIAL STABILITY: SOCIAL INSECURITY: HAS ANYONE EVER THREATENED TO HURT YOUR FAMILY OR YOUR PETS?: NO

## 2024-12-07 SDOH — SOCIAL STABILITY: SOCIAL INSECURITY
WITHIN THE LAST YEAR, HAVE YOU BEEN KICKED, HIT, SLAPPED, OR OTHERWISE PHYSICALLY HURT BY YOUR PARTNER OR EX-PARTNER?: NO

## 2024-12-07 SDOH — SOCIAL STABILITY: SOCIAL INSECURITY
WITHIN THE LAST YEAR, HAVE YOU BEEN RAPED OR FORCED TO HAVE ANY KIND OF SEXUAL ACTIVITY BY YOUR PARTNER OR EX-PARTNER?: NO

## 2024-12-07 SDOH — ECONOMIC STABILITY: FOOD INSECURITY: WITHIN THE PAST 12 MONTHS, THE FOOD YOU BOUGHT JUST DIDN'T LAST AND YOU DIDN'T HAVE MONEY TO GET MORE.: PATIENT DECLINED

## 2024-12-07 SDOH — HEALTH STABILITY: PHYSICAL HEALTH
ON AVERAGE, HOW MANY DAYS PER WEEK DO YOU ENGAGE IN MODERATE TO STRENUOUS EXERCISE (LIKE A BRISK WALK)?: PATIENT DECLINED

## 2024-12-07 SDOH — SOCIAL STABILITY: SOCIAL INSECURITY: DOES ANYONE TRY TO KEEP YOU FROM HAVING/CONTACTING OTHER FRIENDS OR DOING THINGS OUTSIDE YOUR HOME?: NO

## 2024-12-07 SDOH — SOCIAL STABILITY: SOCIAL NETWORK
DO YOU BELONG TO ANY CLUBS OR ORGANIZATIONS SUCH AS CHURCH GROUPS, UNIONS, FRATERNAL OR ATHLETIC GROUPS, OR SCHOOL GROUPS?: PATIENT DECLINED

## 2024-12-07 SDOH — HEALTH STABILITY: MENTAL HEALTH
DO YOU FEEL STRESS - TENSE, RESTLESS, NERVOUS, OR ANXIOUS, OR UNABLE TO SLEEP AT NIGHT BECAUSE YOUR MIND IS TROUBLED ALL THE TIME - THESE DAYS?: PATIENT DECLINED

## 2024-12-07 SDOH — ECONOMIC STABILITY: FOOD INSECURITY
WITHIN THE PAST 12 MONTHS, YOU WORRIED THAT YOUR FOOD WOULD RUN OUT BEFORE YOU GOT THE MONEY TO BUY MORE.: PATIENT DECLINED

## 2024-12-07 SDOH — ECONOMIC STABILITY: INCOME INSECURITY
IN THE PAST 12 MONTHS HAS THE ELECTRIC, GAS, OIL, OR WATER COMPANY THREATENED TO SHUT OFF SERVICES IN YOUR HOME?: PATIENT DECLINED

## 2024-12-07 SDOH — SOCIAL STABILITY: SOCIAL NETWORK: IN A TYPICAL WEEK, HOW MANY TIMES DO YOU TALK ON THE PHONE WITH FAMILY, FRIENDS, OR NEIGHBORS?: PATIENT DECLINED

## 2024-12-07 SDOH — SOCIAL STABILITY: SOCIAL INSECURITY: HAVE YOU HAD THOUGHTS OF HARMING ANYONE ELSE?: NO

## 2024-12-07 SDOH — SOCIAL STABILITY: SOCIAL INSECURITY: WERE YOU ABLE TO COMPLETE ALL THE BEHAVIORAL HEALTH SCREENINGS?: YES

## 2024-12-07 SDOH — SOCIAL STABILITY: SOCIAL NETWORK: HOW OFTEN DO YOU GET TOGETHER WITH FRIENDS OR RELATIVES?: PATIENT DECLINED

## 2024-12-07 SDOH — SOCIAL STABILITY: SOCIAL INSECURITY: ABUSE: ADULT

## 2024-12-07 SDOH — SOCIAL STABILITY: SOCIAL INSECURITY: HAVE YOU HAD ANY THOUGHTS OF HARMING ANYONE ELSE?: NO

## 2024-12-07 SDOH — SOCIAL STABILITY: SOCIAL INSECURITY: ARE YOU OR HAVE YOU BEEN THREATENED OR ABUSED PHYSICALLY, EMOTIONALLY, OR SEXUALLY BY ANYONE?: NO

## 2024-12-07 SDOH — SOCIAL STABILITY: SOCIAL NETWORK: HOW OFTEN DO YOU ATTEND CHURCH OR RELIGIOUS SERVICES?: PATIENT DECLINED

## 2024-12-07 SDOH — SOCIAL STABILITY: SOCIAL INSECURITY: DO YOU FEEL UNSAFE GOING BACK TO THE PLACE WHERE YOU ARE LIVING?: NO

## 2024-12-07 ASSESSMENT — LIFESTYLE VARIABLES
AUDIT-C TOTAL SCORE: 0
HOW MANY STANDARD DRINKS CONTAINING ALCOHOL DO YOU HAVE ON A TYPICAL DAY: PATIENT DOES NOT DRINK
PRESCIPTION_ABUSE_PAST_12_MONTHS: NO
HOW OFTEN DO YOU HAVE A DRINK CONTAINING ALCOHOL: NEVER
SUBSTANCE_ABUSE_PAST_12_MONTHS: NO
HOW OFTEN DO YOU HAVE 6 OR MORE DRINKS ON ONE OCCASION: NEVER
SKIP TO QUESTIONS 9-10: 1
AUDIT-C TOTAL SCORE: 0

## 2024-12-07 ASSESSMENT — VISUAL ACUITY: OU: 1

## 2024-12-07 ASSESSMENT — ENCOUNTER SYMPTOMS
NERVOUS/ANXIOUS: 0
TROUBLE SWALLOWING: 0
PALPITATIONS: 0
COLOR CHANGE: 0
ABDOMINAL PAIN: 0
CHILLS: 0
FATIGUE: 0
WOUND: 0
CONFUSION: 0
FLANK PAIN: 0
FREQUENCY: 0
LIGHT-HEADEDNESS: 0
NAUSEA: 0
VOMITING: 0
CONSTIPATION: 1
DIZZINESS: 0
DYSURIA: 0
WEAKNESS: 0
BACK PAIN: 0
CHEST TIGHTNESS: 0
HEMATURIA: 1
ABDOMINAL DISTENTION: 0
AGITATION: 0
SORE THROAT: 0
DIARRHEA: 0
FEVER: 0
SHORTNESS OF BREATH: 0
DYSPHORIC MOOD: 0
COUGH: 0

## 2024-12-07 ASSESSMENT — ACTIVITIES OF DAILY LIVING (ADL)
LACK_OF_TRANSPORTATION: PATIENT DECLINED
FEEDING YOURSELF: INDEPENDENT
HEARING - LEFT EAR: FUNCTIONAL
JUDGMENT_ADEQUATE_SAFELY_COMPLETE_DAILY_ACTIVITIES: YES
TOILETING: NEEDS ASSISTANCE
DRESSING YOURSELF: NEEDS ASSISTANCE
GROOMING: NEEDS ASSISTANCE
ADEQUATE_TO_COMPLETE_ADL: YES
BATHING: NEEDS ASSISTANCE
PATIENT'S MEMORY ADEQUATE TO SAFELY COMPLETE DAILY ACTIVITIES?: YES
WALKS IN HOME: DEPENDENT
HEARING - RIGHT EAR: FUNCTIONAL

## 2024-12-07 ASSESSMENT — COGNITIVE AND FUNCTIONAL STATUS - GENERAL
TURNING FROM BACK TO SIDE WHILE IN FLAT BAD: A LITTLE
EATING MEALS: A LITTLE
WALKING IN HOSPITAL ROOM: A LOT
TOILETING: A LOT
MOBILITY SCORE: 14
DAILY ACTIVITIY SCORE: 13
DRESSING REGULAR UPPER BODY CLOTHING: A LOT
DRESSING REGULAR LOWER BODY CLOTHING: A LOT
STANDING UP FROM CHAIR USING ARMS: A LOT
PATIENT BASELINE BEDBOUND: YES
PERSONAL GROOMING: A LOT
MOVING TO AND FROM BED TO CHAIR: A LOT
MOVING FROM LYING ON BACK TO SITTING ON SIDE OF FLAT BED WITH BEDRAILS: A LITTLE
CLIMB 3 TO 5 STEPS WITH RAILING: A LOT
HELP NEEDED FOR BATHING: A LOT

## 2024-12-07 ASSESSMENT — COLUMBIA-SUICIDE SEVERITY RATING SCALE - C-SSRS
2. HAVE YOU ACTUALLY HAD ANY THOUGHTS OF KILLING YOURSELF?: NO
1. IN THE PAST MONTH, HAVE YOU WISHED YOU WERE DEAD OR WISHED YOU COULD GO TO SLEEP AND NOT WAKE UP?: NO
6. HAVE YOU EVER DONE ANYTHING, STARTED TO DO ANYTHING, OR PREPARED TO DO ANYTHING TO END YOUR LIFE?: NO

## 2024-12-07 ASSESSMENT — PATIENT HEALTH QUESTIONNAIRE - PHQ9
2. FEELING DOWN, DEPRESSED OR HOPELESS: NOT AT ALL
SUM OF ALL RESPONSES TO PHQ9 QUESTIONS 1 & 2: 0
1. LITTLE INTEREST OR PLEASURE IN DOING THINGS: NOT AT ALL

## 2024-12-07 ASSESSMENT — PAIN DESCRIPTION - ORIENTATION: ORIENTATION: LEFT

## 2024-12-07 ASSESSMENT — PAIN SCALES - GENERAL
PAINLEVEL_OUTOF10: 5 - MODERATE PAIN
PAINLEVEL_OUTOF10: 4
PAINLEVEL_OUTOF10: 4
PAINLEVEL_OUTOF10: 0 - NO PAIN

## 2024-12-07 ASSESSMENT — PAIN DESCRIPTION - LOCATION: LOCATION: ARM

## 2024-12-07 NOTE — H&P
History Of Present Illness  Efra Shaikh is a 69 y.o. M from [facility] with PMH of HTN, HLD, T2DM, CVA, BPH, chronic indwelling Franco catheter due to urinary retention presented to ER in [facility] due to hematuria.  Patient was apparently in the ER few days ago with a similar complaint and was sent back to [facility] from where he came back to ER again..  Patient apparently was on Brilinta for his previous stroke which was recently DC'd due to hematuria.  There was no obvious fever or chills.  Patient was transferred to [facility] as there was no urologist available in [facility].  He was admitted on 3 S. daily for further care.     Past Medical History  He has a past medical history of Anemia, Anxiety, BPH (benign prostatic hyperplasia), Bursitis, Chronic indwelling Franco catheter, Depression, Hemiplegia affecting left nondominant side (Multi), History of CVA with residual deficit, chronic kidney disease, Hyperlipidemia, Hypertension, Insulin dependent type 2 diabetes mellitus (Multi), and Osteoarthritis.    Surgical History  He has a past surgical history that includes Rotator cuff repair (Right); Total knee arthroplasty (Left); Cataract extraction; Tonsillectomy; Esophagogastroduodenoscopy; and Colonoscopy.     Social History  He reports that he has been smoking cigarettes. He started smoking about 65 years ago. He has a 33 pack-year smoking history. He has been exposed to tobacco smoke. He has never used smokeless tobacco. He reports that he does not currently use alcohol. He reports that he does not use drugs.    Family History  Family History   Problem Relation Name Age of Onset    Cancer Mother      Brain cancer Father      Cancer Sister      Stroke Maternal Grandmother          Allergies  Patient has no known allergies.    Current medications:      Current Facility-Administered Medications:     acetaminophen (Tylenol) tablet 650 mg, 650 mg, oral, q6h PRN, Indigo Kennedy, APRN-CNP,  650 mg at 12/07/24 0916    bisacodyl (Dulcolax) EC tablet 10 mg, 10 mg, oral, Daily, Indigo Marcia, APRN-CNP, 10 mg at 12/07/24 0918    dextrose 50 % injection 12.5 g, 12.5 g, intravenous, q15 min PRN, Indigo Marcia, APRN-CNP    dextrose 50 % injection 25 g, 25 g, intravenous, q15 min PRN, Indigo Marcia, APRN-CNP    glucagon (Glucagen) injection 1 mg, 1 mg, intramuscular, q15 min PRN, Indigo Marcia, APRN-CNP    glucagon (Glucagen) injection 1 mg, 1 mg, intramuscular, q15 min PRN, Indigo Marcia, APRN-CNP    insulin lispro injection 0-10 Units, 0-10 Units, subcutaneous, Before meals & nightly, Indigo Marcia, APRN-CNP, 2 Units at 12/07/24 0927    melatonin tablet 3 mg, 3 mg, oral, Nightly PRN, Indigo Marcia, APRN-CNP    nicotine (Nicoderm CQ) 21 mg/24 hr patch 1 patch, 1 patch, transdermal, Daily, 1 patch at 12/07/24 0918 **FOLLOWED BY** [START ON 1/18/2025] nicotine (Nicoderm CQ) 14 mg/24 hr patch 1 patch, 1 patch, transdermal, Daily **FOLLOWED BY** [START ON 2/1/2025] nicotine (Nicoderm CQ) 7 mg/24 hr patch 1 patch, 1 patch, transdermal, Daily, Indigo Marcia, APRN-CNP    pantoprazole (ProtoNix) EC tablet 40 mg, 40 mg, oral, Daily before breakfast, Indigo Marcia, APRN-CNP, 40 mg at 12/07/24 0641    polyethylene glycol (Glycolax, Miralax) packet 17 g, 17 g, oral, Daily, Indigo Marcia, APRN-CNP, 17 g at 12/07/24 0918    sennosides (Senokot) tablet 17.2 mg, 2 tablet, oral, Daily, Indigo Marcia, APRN-CNP, 17.2 mg at 12/07/24 0918    tamsulosin (Flomax) 24 hr capsule 0.4 mg, 0.4 mg, oral, Daily, Indigo Schultzi, APRN-CNP, 0.4 mg at 12/07/24 0918       Review of Systems     10 organ ROS is pertinent for history mentioned above, otherwise negative    Physical Exam  HENT:      Head: Normocephalic.   Eyes:      Conjunctiva/sclera: Conjunctivae normal.   Cardiovascular:      Rate and Rhythm: Regular rhythm.   Pulmonary:      Breath sounds: Normal breath sounds.   Abdominal:      General: Bowel sounds are normal.      Palpations: Abdomen is soft.  "  Musculoskeletal:         General: Normal range of motion.   Skin:     General: Skin is warm and dry.   Neurological:      General: No focal deficit present.      Mental Status: He is alert.   Psychiatric:         Behavior: Behavior normal.              Last Recorded Vitals      Blood pressure 147/70, pulse 56, temperature 36 °C (96.8 °F), resp. rate 19, height 1.778 m (5' 10\"), weight 85.5 kg (188 lb 7.9 oz), SpO2 100%.    Relevant Results     Results for orders placed or performed during the hospital encounter of 12/07/24 (from the past 24 hours)   CBC   Result Value Ref Range    WBC 4.3 (L) 4.4 - 11.3 x10*3/uL    nRBC 0.0 0.0 - 0.0 /100 WBCs    RBC 4.00 (L) 4.50 - 5.90 x10*6/uL    Hemoglobin 10.8 (L) 13.5 - 17.5 g/dL    Hematocrit 35.2 (L) 41.0 - 52.0 %    MCV 88 80 - 100 fL    MCH 27.0 26.0 - 34.0 pg    MCHC 30.7 (L) 32.0 - 36.0 g/dL    RDW 16.2 (H) 11.5 - 14.5 %    Platelets 250 150 - 450 x10*3/uL   Comprehensive Metabolic Panel   Result Value Ref Range    Glucose 181 (H) 74 - 99 mg/dL    Sodium 135 (L) 136 - 145 mmol/L    Potassium 4.0 3.5 - 5.3 mmol/L    Chloride 100 98 - 107 mmol/L    Bicarbonate 27 21 - 32 mmol/L    Anion Gap 12 10 - 20 mmol/L    Urea Nitrogen 20 6 - 23 mg/dL    Creatinine 0.46 (L) 0.50 - 1.30 mg/dL    eGFR >90 >60 mL/min/1.73m*2    Calcium 9.2 8.6 - 10.3 mg/dL    Albumin 4.0 3.4 - 5.0 g/dL    Alkaline Phosphatase 60 33 - 136 U/L    Total Protein 6.4 6.4 - 8.2 g/dL    AST 8 (L) 9 - 39 U/L    Bilirubin, Total 0.5 0.0 - 1.2 mg/dL    ALT 11 10 - 52 U/L   Magnesium   Result Value Ref Range    Magnesium 1.69 1.60 - 2.40 mg/dL   Phosphorus   Result Value Ref Range    Phosphorus 3.1 2.5 - 4.9 mg/dL   Coagulation Screen   Result Value Ref Range    Protime 13.1 (H) 9.8 - 12.8 seconds    INR 1.2 (H) 0.9 - 1.1    aPTT 32 27 - 38 seconds   POCT GLUCOSE   Result Value Ref Range    POCT Glucose 160 (H) 74 - 99 mg/dL   ECG 12 Lead   Result Value Ref Range    Ventricular Rate 55 BPM    Atrial Rate 55 BPM "    ND Interval 168 ms    QRS Duration 106 ms    QT Interval 434 ms    QTC Calculation(Bazett) 415 ms    P Axis 13 degrees    R Axis -25 degrees    T Axis -13 degrees    QRS Count 9 beats    Q Onset 225 ms    P Onset 141 ms    P Offset 203 ms    T Offset 442 ms    QTC Fredericia 421 ms   POCT GLUCOSE   Result Value Ref Range    POCT Glucose 173 (H) 74 - 99 mg/dL          Radiology  CT ABDOMEN PELVIS WO IV CONTRAST; 12/7/2024 8:17 am      INDICATION:  Signs/Symptoms:hematuria with clots      COMPARISON:  None      ACCESSION NUMBER(S):  [id number]      ORDERING CLINICIAN:  DARIUSZ OVALLES      TECHNIQUE:  Spiral axial unenhanced images were obtained from the upper abdomen  to the symphysis pubis. Oral contrast was not administered.      All CT examinations are performed with one or more of the following  dose reduction techniques: Automated Exposure Control, adjustment of  mA and/or kV according to patient size, or use of iterative  reconstruction techniques.      FINDINGS:  Lung bases: The lung bases are clear.  Liver: Normal in size without focal lesions.  Gallbladder: Distended gallbladder, measuring up to 5.6 cm in  transverse dimension, with possible gallstones and sludge. Spleen:  Normal in size without focal lesions. Pancreas: Unremarkable.  Adrenal glands: No focal lesions.  The kidneys are normal in size and position. Complex cystic lesion  measuring 9.4 cm is noted in the upper pole of the left kidney  containing partially calcified septations. There also a couple of  smaller cysts in the left kidney measuring up to 2 cm. There are no  renal calculi or hydronephrosis. No abnormal calcifications are seen  within the course of the ureters or within the bladder. The bladder  is decompressed by a Franco catheter, with apparent wall thickening.  The prostate gland is enlarged, measuring 6.5 cm, projecting in the  bladder floor.      Copious amount of fecal material is noted throughout the colon,  suggestive of  constipation, without bowel obstruction. The appendix  is seen and appears normal. There is no free air or free fluid in the  abdomen and pelvis. Atherosclerotic calcifications involve the aorta,  without focal aneurysm. There is disc disease involving the lower  lumbar spine, especially the L5-S1 level, with minimal  anterolisthesis of the L5 over the S1 vertebra.      IMPRESSION:  1. Large complex cystic lesion involving the upper pole of the left  kidney, most likely septated/partially calcified cysts. In the  setting of hematuria this finding can be further evaluated with  nonemergent MRI.  2. Suboptimally evaluated decompressed urinary bladder. In the  setting of hematuria further evaluation can be obtained with  cystoscopy.  3. Distended gallbladder containing gallstones and possible sludge;  this can be further evaluated with ultrasound if clinically indicated.          Assessment/Plan   Assessment & Plan  Hematuria    Chronic indwelling Franco catheter    Hyponatremia    Constipation      PLAN: Patient was admitted on 3 S. daily, was started on CBI, urology consult, monitor, H&H, transfuse if needed, may go for cystoscopy, monitor sodium level, med list and labs reviewed, for now continue with current Rx, DVT prophylaxis, follow closely    Salvador Biswas MD

## 2024-12-07 NOTE — H&P
History Of Present Illness  Efra Shaikh is a 69 y.o. male with past medical history of hypertension, hyperlipidemia, insulin-dependent type 2 diabetes mellitus, CVA with left-sided hemiplegia and decreased vision, anemia, BPH, chronic indwelling Franco catheter for retention, anxiety, depression and osteoarthritis presents to [facility] on 12/7/2024 from [facility] with concern for hematuria.    Outside ER documentation:  Patient initially presented to outside hospital from an extended care facility for concern of gross hematuria.  Per outside documentation, patient was brought to the same ER on 12/4 and at that time continuous bladder irrigation was continued inevitably.  According to staff at the extended care facility, patient started bleeding again within 6 hours of returning to the facility and did not have any improvement in the gross hematuria.  Patient then started to complain of suprapubic discomfort and was subsequently transferred to the hospital for evaluation and perspective bladder scope.  The patient was previously on Brilinta for his stroke, but this was discontinued by the doctor at the facility due to hematuria.  Last approximate dose of Brilinta was likely on 12/4.  Patient was complaining of a suprapubic pressure which was constant.    Currently, the patient states that his suprapubic pain has improved since CBI was restarted at the [place] [facility].  He mentions that multiple large clots were removed via manual irrigation.  He states that he first noticed the clots in his Franco catheter on 12/4 and has had his catheter replaced multiple times this past week.  Most recently changed on Thursday.  Patient has a Franco catheter for chronic urinary retention.  His only other complaint at this time is feeling constipated and he is requesting medication to help him have a bowel movement.  He denies any recent fever, chills, headache, dizziness, chest pain / palpitations, shortness of breath, cough,  abdominal pain, nausea, vomiting, diarrhea.    ED Course at [facility]:  Vital signs: Temp. 98.2F, HR 70 bpm, RR 23, /63, SPO2 97% on room air   CMP: Glucose 167, Na+ 139, K+ 4.0, BUN 23, creatinine 0.72, EGFR >60  CBC w/diff: WBC 5.0, H&H 10.8/33.4 respectively, platelet 264  Troponin I : 6.2, 5.8 (WNL per OSH range)  EKG: Sinus rhythm, ventricular rate 61 bpm,  ms,  ms, QTc 409 ms. No ST elevation or depression noted.  Disposition: Patient was transferred to [facility] for concern of gross hematuria requiring CBI and outside hospital not having anesthesia available should patient require urologic intervention.    Past Medical History  Past Medical History:   Diagnosis Date    Anemia     Anxiety     BPH (benign prostatic hyperplasia)     Bursitis     Chronic indwelling Franco catheter     Depression     Hemiplegia affecting left nondominant side (Multi)     History of CVA with residual deficit     Left-sided deficits    Hx of chronic kidney disease     Hyperlipidemia     Hypertension     Insulin dependent type 2 diabetes mellitus (Multi)     Osteoarthritis       Surgical History  He has a past surgical history that includes Rotator cuff repair (Right); Total knee arthroplasty (Left); Cataract extraction; Tonsillectomy; Esophagogastroduodenoscopy; and Colonoscopy.     Social History  He reports that he has been smoking cigarettes. He started smoking about 65 years ago. He has a 33 pack-year smoking history. He has been exposed to tobacco smoke. He has never used smokeless tobacco. He reports that he does not currently use alcohol. He reports that he does not use drugs.    Family History  Family History   Problem Relation Name Age of Onset    Cancer Mother      Brain cancer Father      Cancer Sister      Stroke Maternal Grandmother       Allergies  Patient has no known allergies.    Review of Systems   Constitutional:  Negative for chills, fatigue and fever.   HENT:  Negative for  hearing loss, sore throat and trouble swallowing.    Eyes:  Negative for visual disturbance.   Respiratory:  Negative for cough, chest tightness and shortness of breath.    Cardiovascular:  Positive for leg swelling. Negative for chest pain and palpitations.   Gastrointestinal:  Positive for constipation. Negative for abdominal distention, abdominal pain, diarrhea, nausea and vomiting.   Genitourinary:  Positive for hematuria. Negative for dysuria, flank pain, frequency and urgency.   Musculoskeletal:  Negative for back pain and gait problem.   Skin:  Negative for color change, rash and wound.   Neurological:  Negative for dizziness, syncope, weakness and light-headedness.   Psychiatric/Behavioral:  Negative for agitation, confusion and dysphoric mood. The patient is not nervous/anxious.      Physical Exam  Vitals reviewed.   Constitutional:       General: He is not in acute distress.  HENT:      Head: Normocephalic and atraumatic.      Right Ear: External ear normal.      Left Ear: External ear normal.      Nose: Nose normal.      Mouth/Throat:      Mouth: Mucous membranes are moist.      Pharynx: Oropharynx is clear.   Eyes:      General: Lids are normal. Vision grossly intact.      Pupils: Pupils are equal, round, and reactive to light.   Cardiovascular:      Rate and Rhythm: Normal rate and regular rhythm.      Pulses:           Radial pulses are 2+ on the right side and 2+ on the left side.        Dorsalis pedis pulses are 1+ on the right side and 1+ on the left side.        Posterior tibial pulses are 1+ on the right side and 1+ on the left side.      Heart sounds: Normal heart sounds. No murmur heard.  Pulmonary:      Effort: Pulmonary effort is normal.      Breath sounds: Normal breath sounds. No wheezing, rhonchi or rales.   Abdominal:      General: Bowel sounds are normal. There is no distension.      Palpations: Abdomen is soft.      Tenderness: There is no abdominal tenderness. There is no right CVA  "tenderness or left CVA tenderness.   Genitourinary:     Comments: Three-way Franco catheter with CBI running  Musculoskeletal:      Right lower le+ Edema present.      Left lower le+ Edema present.   Neurological:      Mental Status: He is alert and oriented to person, place, and time. Mental status is at baseline.      Sensory: Sensation is intact.      Motor: Weakness present.      Comments: Left-sided hemiplegia   Psychiatric:         Attention and Perception: Attention and perception normal.         Mood and Affect: Mood and affect normal.         Speech: Speech normal.         Behavior: Behavior normal. Behavior is cooperative.         Thought Content: Thought content normal.       Last Recorded Vitals  Blood pressure 158/70, pulse 63, temperature 36.6 °C (97.9 °F), temperature source Temporal, resp. rate 18, height 1.778 m (5' 10\"), weight 85.5 kg (188 lb 7.9 oz), SpO2 95%.  Visit Vitals  /70 (BP Location: Right arm, Patient Position: Lying)   Pulse 63   Temp 36.6 °C (97.9 °F) (Temporal)   Resp 18   Ht 1.778 m (5' 10\")   Wt 85.5 kg (188 lb 7.9 oz)   SpO2 95%   BMI 27.05 kg/m²   Smoking Status Every Day   BSA 2.05 m²     Weight: 85.5 kg (188 lb 7.9 oz)      Relevant Results  Results for orders placed or performed during the hospital encounter of 24 (from the past 24 hours)   CBC   Result Value Ref Range    WBC 4.3 (L) 4.4 - 11.3 x10*3/uL    nRBC 0.0 0.0 - 0.0 /100 WBCs    RBC 4.00 (L) 4.50 - 5.90 x10*6/uL    Hemoglobin 10.8 (L) 13.5 - 17.5 g/dL    Hematocrit 35.2 (L) 41.0 - 52.0 %    MCV 88 80 - 100 fL    MCH 27.0 26.0 - 34.0 pg    MCHC 30.7 (L) 32.0 - 36.0 g/dL    RDW 16.2 (H) 11.5 - 14.5 %    Platelets 250 150 - 450 x10*3/uL   Comprehensive Metabolic Panel   Result Value Ref Range    Glucose 181 (H) 74 - 99 mg/dL    Sodium 135 (L) 136 - 145 mmol/L    Potassium 4.0 3.5 - 5.3 mmol/L    Chloride 100 98 - 107 mmol/L    Bicarbonate 27 21 - 32 mmol/L    Anion Gap 12 10 - 20 mmol/L    Urea " Nitrogen 20 6 - 23 mg/dL    Creatinine 0.46 (L) 0.50 - 1.30 mg/dL    eGFR >90 >60 mL/min/1.73m*2    Calcium 9.2 8.6 - 10.3 mg/dL    Albumin 4.0 3.4 - 5.0 g/dL    Alkaline Phosphatase 60 33 - 136 U/L    Total Protein 6.4 6.4 - 8.2 g/dL    AST 8 (L) 9 - 39 U/L    Bilirubin, Total 0.5 0.0 - 1.2 mg/dL    ALT 11 10 - 52 U/L   Magnesium   Result Value Ref Range    Magnesium 1.69 1.60 - 2.40 mg/dL   Phosphorus   Result Value Ref Range    Phosphorus 3.1 2.5 - 4.9 mg/dL   Coagulation Screen   Result Value Ref Range    Protime 13.1 (H) 9.8 - 12.8 seconds    INR 1.2 (H) 0.9 - 1.1    aPTT 32 27 - 38 seconds   POCT GLUCOSE   Result Value Ref Range    POCT Glucose 160 (H) 74 - 99 mg/dL      No results found.       Home Medications  Prior to Admission medications    Not on File       Medications  Scheduled medications  bisacodyl, 10 mg, oral, Daily  insulin lispro, 0-10 Units, subcutaneous, Before meals & nightly  nicotine, 1 patch, transdermal, Daily   Followed by  [START ON 1/18/2025] nicotine, 1 patch, transdermal, Daily   Followed by  [START ON 2/1/2025] nicotine, 1 patch, transdermal, Daily  pantoprazole, 40 mg, oral, Daily before breakfast  polyethylene glycol, 17 g, oral, Daily  sennosides, 2 tablet, oral, Daily  tamsulosin, 0.4 mg, oral, Daily      Continuous medications     PRN medications  PRN medications: acetaminophen, dextrose, dextrose, glucagon, glucagon, melatonin        Assessment/Plan   Assessment & Plan  Hematuria    Chronic indwelling Franco catheter    Hyponatremia    Constipation      Plan:  Code Status: DNR and No Intubation. [place] DNR form completed and attached to EMR  - Attempted to call daughter at the number provided by the patient, automated message  Consult: Urology  ATB: None indicated at this time  Meds: Sliding scale insulin, hypoglycemia protocol.  Daily nicotine patch.  Dulcolax 10 mg p.o. daily, MiraLAX 17 g p.o. daily, Senokot 17.2 mg p.o. daily.  Pantoprazole 40 mg p.o. daily. Tamsulosin 0.4 mg  capsule p.o. daily.  Tylenol 650 mg p.o. every 6 hours as needed for pain 1-6/headaches, melatonin 3 milligrams p.o. daily as needed for insomnia,   Monitor for hypo/hyperglycemia signs and symptoms   Once med rec has been completed, plan to hold anticoagulation such as aspirin/brilinta until cleared with urology  Diet: Cardiac, Carb Consistent  Telemetry monitoring   Vital signs with BP, HR, & RR Q 4 hours.  Pulse ox Q 4 hours.   Admission height and weight.  Daily weight.  Continuous bladder irrigation until evaluated by urology  Maintain Franco catheter  Labs ordered: CBC, BMP, Mg, Phos. UA w/ reflex culture  Test ordered: CT abdomen/pelvis  Monitor / trend labs while inpatient.  Monitor and replace electrolytes as needed.  Transfuse with PRBC for hemoglobin<7.0   Strict I&Os   Aspiration precautions.  Fall precautions   Out of bed with assistance   PT/OT eval and treat as indicated   Encourage & educate on smoking cessation   Call physician for temperature < 36.5 or >38.0 degrees celsius.  Call physician for pulse <50 or >120.  Call physician for respiratory rate <10 or >22.  Call physician for systolic blood pressure <90 or >170.  Call physician for diastolic blood pressure < 50 or >100.  Call physician for pulse ox <92%.    VTE prophylaxis:   No pharmacological prophylaxis ordered at this time due to presentation with hematuria  BLE SCDs.  Monitor for s/s of bleeding    Medication reconciliation to be completed when nursing/pharmacy  are able to verify patient's accurate home medications.    See additional orders for further plan of care. Any further evaluation and management per attending and consulting physicians.      This note has been transcribed using Dragon voice recognition system and there is a possibility of unintentional typing misprints.  Any information found to be copied from previous providers is done in the best interest of the patient to provide accurate, quality, and continuity of care.     I  spent 60 minutes in the professional and overall care of this patient.      LIVE Garcias-JAMAL  [facility]

## 2024-12-08 VITALS
SYSTOLIC BLOOD PRESSURE: 117 MMHG | RESPIRATION RATE: 21 BRPM | OXYGEN SATURATION: 93 % | TEMPERATURE: 99 F | DIASTOLIC BLOOD PRESSURE: 60 MMHG | HEART RATE: 94 BPM | BODY MASS INDEX: 27.81 KG/M2 | HEIGHT: 70 IN | WEIGHT: 194.22 LBS

## 2024-12-08 LAB
ALBUMIN SERPL BCP-MCNC: 3.9 G/DL (ref 3.4–5)
ALP SERPL-CCNC: 57 U/L (ref 33–136)
ALT SERPL W P-5'-P-CCNC: 11 U/L (ref 10–52)
ANION GAP SERPL CALC-SCNC: 12 MMOL/L (ref 10–20)
AST SERPL W P-5'-P-CCNC: 8 U/L (ref 9–39)
BILIRUB SERPL-MCNC: 0.5 MG/DL (ref 0–1.2)
BUN SERPL-MCNC: 19 MG/DL (ref 6–23)
CALCIUM SERPL-MCNC: 9.3 MG/DL (ref 8.6–10.3)
CHLORIDE SERPL-SCNC: 99 MMOL/L (ref 98–107)
CO2 SERPL-SCNC: 27 MMOL/L (ref 21–32)
CREAT SERPL-MCNC: 0.56 MG/DL (ref 0.5–1.3)
EGFRCR SERPLBLD CKD-EPI 2021: >90 ML/MIN/1.73M*2
ERYTHROCYTE [DISTWIDTH] IN BLOOD BY AUTOMATED COUNT: 16.3 % (ref 11.5–14.5)
GLUCOSE BLD MANUAL STRIP-MCNC: 156 MG/DL (ref 74–99)
GLUCOSE BLD MANUAL STRIP-MCNC: 171 MG/DL (ref 74–99)
GLUCOSE BLD MANUAL STRIP-MCNC: 183 MG/DL (ref 74–99)
GLUCOSE BLD MANUAL STRIP-MCNC: 199 MG/DL (ref 74–99)
GLUCOSE SERPL-MCNC: 169 MG/DL (ref 74–99)
HCT VFR BLD AUTO: 32.2 % (ref 41–52)
HCT VFR BLD AUTO: 32.6 % (ref 41–52)
HGB BLD-MCNC: 10.6 G/DL (ref 13.5–17.5)
HGB BLD-MCNC: 10.6 G/DL (ref 13.5–17.5)
HOLD SPECIMEN: NORMAL
MAGNESIUM SERPL-MCNC: 1.79 MG/DL (ref 1.6–2.4)
MCH RBC QN AUTO: 27.2 PG (ref 26–34)
MCHC RBC AUTO-ENTMCNC: 32.5 G/DL (ref 32–36)
MCV RBC AUTO: 84 FL (ref 80–100)
NRBC BLD-RTO: 0 /100 WBCS (ref 0–0)
PHOSPHATE SERPL-MCNC: 4.3 MG/DL (ref 2.5–4.9)
PLATELET # BLD AUTO: 249 X10*3/UL (ref 150–450)
POTASSIUM SERPL-SCNC: 4.1 MMOL/L (ref 3.5–5.3)
PROT SERPL-MCNC: 6.2 G/DL (ref 6.4–8.2)
RBC # BLD AUTO: 3.9 X10*6/UL (ref 4.5–5.9)
SODIUM SERPL-SCNC: 134 MMOL/L (ref 136–145)
WBC # BLD AUTO: 3.9 X10*3/UL (ref 4.4–11.3)

## 2024-12-08 PROCEDURE — 82947 ASSAY GLUCOSE BLOOD QUANT: CPT

## 2024-12-08 PROCEDURE — 2500000002 HC RX 250 W HCPCS SELF ADMINISTERED DRUGS (ALT 637 FOR MEDICARE OP, ALT 636 FOR OP/ED)

## 2024-12-08 PROCEDURE — 85014 HEMATOCRIT: CPT | Performed by: NURSE PRACTITIONER

## 2024-12-08 PROCEDURE — 2500000001 HC RX 250 WO HCPCS SELF ADMINISTERED DRUGS (ALT 637 FOR MEDICARE OP): Performed by: NURSE PRACTITIONER

## 2024-12-08 PROCEDURE — 83735 ASSAY OF MAGNESIUM: CPT

## 2024-12-08 PROCEDURE — 1200000002 HC GENERAL ROOM WITH TELEMETRY DAILY

## 2024-12-08 PROCEDURE — 99223 1ST HOSP IP/OBS HIGH 75: CPT | Performed by: STUDENT IN AN ORGANIZED HEALTH CARE EDUCATION/TRAINING PROGRAM

## 2024-12-08 PROCEDURE — 2500000005 HC RX 250 GENERAL PHARMACY W/O HCPCS

## 2024-12-08 PROCEDURE — 36415 COLL VENOUS BLD VENIPUNCTURE: CPT | Performed by: NURSE PRACTITIONER

## 2024-12-08 PROCEDURE — S4991 NICOTINE PATCH NONLEGEND: HCPCS

## 2024-12-08 PROCEDURE — 85027 COMPLETE CBC AUTOMATED: CPT

## 2024-12-08 PROCEDURE — 80053 COMPREHEN METABOLIC PANEL: CPT

## 2024-12-08 PROCEDURE — 2500000004 HC RX 250 GENERAL PHARMACY W/ HCPCS (ALT 636 FOR OP/ED)

## 2024-12-08 PROCEDURE — 84100 ASSAY OF PHOSPHORUS: CPT

## 2024-12-08 PROCEDURE — 2500000004 HC RX 250 GENERAL PHARMACY W/ HCPCS (ALT 636 FOR OP/ED): Performed by: NURSE PRACTITIONER

## 2024-12-08 PROCEDURE — 36415 COLL VENOUS BLD VENIPUNCTURE: CPT

## 2024-12-08 PROCEDURE — 2500000001 HC RX 250 WO HCPCS SELF ADMINISTERED DRUGS (ALT 637 FOR MEDICARE OP)

## 2024-12-08 RX ORDER — HYDROXYZINE HYDROCHLORIDE 50 MG/1
50 TABLET, FILM COATED ORAL ONCE
Status: COMPLETED | OUTPATIENT
Start: 2024-12-08 | End: 2024-12-08

## 2024-12-08 RX ORDER — LANOLIN ALCOHOL/MO/W.PET/CERES
400 CREAM (GRAM) TOPICAL ONCE
Status: COMPLETED | OUTPATIENT
Start: 2024-12-08 | End: 2024-12-08

## 2024-12-08 ASSESSMENT — COGNITIVE AND FUNCTIONAL STATUS - GENERAL
CLIMB 3 TO 5 STEPS WITH RAILING: TOTAL
TURNING FROM BACK TO SIDE WHILE IN FLAT BAD: A LITTLE
HELP NEEDED FOR BATHING: A LOT
STANDING UP FROM CHAIR USING ARMS: TOTAL
DRESSING REGULAR UPPER BODY CLOTHING: A LOT
TOILETING: A LOT
EATING MEALS: A LITTLE
MOVING FROM LYING ON BACK TO SITTING ON SIDE OF FLAT BED WITH BEDRAILS: A LITTLE
WALKING IN HOSPITAL ROOM: TOTAL
TOILETING: A LOT
STANDING UP FROM CHAIR USING ARMS: TOTAL
EATING MEALS: A LOT
MOVING FROM LYING ON BACK TO SITTING ON SIDE OF FLAT BED WITH BEDRAILS: A LITTLE
CLIMB 3 TO 5 STEPS WITH RAILING: TOTAL
TURNING FROM BACK TO SIDE WHILE IN FLAT BAD: A LITTLE
PERSONAL GROOMING: A LOT
DRESSING REGULAR LOWER BODY CLOTHING: A LOT
DAILY ACTIVITIY SCORE: 13
DRESSING REGULAR LOWER BODY CLOTHING: A LOT
PERSONAL GROOMING: A LOT
MOBILITY SCORE: 10
DRESSING REGULAR UPPER BODY CLOTHING: A LOT
MOVING TO AND FROM BED TO CHAIR: TOTAL
MOVING TO AND FROM BED TO CHAIR: TOTAL
DAILY ACTIVITIY SCORE: 12
MOBILITY SCORE: 10
WALKING IN HOSPITAL ROOM: TOTAL
HELP NEEDED FOR BATHING: A LOT

## 2024-12-08 ASSESSMENT — PAIN SCALES - PAIN ASSESSMENT IN ADVANCED DEMENTIA (PAINAD)
NEGVOCALIZATION: OCCASIONAL MOAN/GROAN, LOW SPEECH, NEGATIVE/DISAPPROVING QUALITY
BREATHING: NORMAL
TOTALSCORE: MEDICATION (SEE MAR)
FACIALEXPRESSION: SMILING OR INEXPRESSIVE
FACIALEXPRESSION: SAD, FRIGHTENED, FROWN
FACIALEXPRESSION: SMILING OR INEXPRESSIVE
CONSOLABILITY: UNABLE TO CONSOLE, DISTRACT OR REASSURE
CONSOLABILITY: UNABLE TO CONSOLE, DISTRACT OR REASSURE
BODYLANGUAGE: TENSE, DISTRESSED PACING, FIDGETING
TOTALSCORE: 5
BREATHING: NORMAL
NEGVOCALIZATION: OCCASIONAL MOAN/GROAN, LOW SPEECH, NEGATIVE/DISAPPROVING QUALITY
TOTALSCORE: REST
CONSOLABILITY: DISTRACTED OR REASSURED BY VOICE/TOUCH
BREATHING: NORMAL
TOTALSCORE: MEDICATION (SEE MAR);REPOSITIONED

## 2024-12-08 ASSESSMENT — PAIN SCALES - GENERAL
PAINLEVEL_OUTOF10: 0 - NO PAIN

## 2024-12-08 NOTE — CONSULTS
Reason For Consult  Gross hematuria    History Of Present Illness  Efra Shaikh is a 69 y.o. male presenting with gross hematuria with suprapubic discomfort, initially presenting to outside hospital on 12/4/2024  - Bleeding recurred within 6 hours of returning to extended care facility  - Suprapubic pain improved since CBI restart at [facility]  - Multiple large clots removed via manual irrigation  - Reports constipation, requesting medication  - Denies fever, chills, headache, dizziness, chest pain, palpitations, SOB, cough, abdominal pain, N/V, diarrhea.     Past Medical History  He has a past medical history of Anemia, Anxiety, BPH (benign prostatic hyperplasia), Bursitis, Chronic indwelling Franco catheter, Depression, Hemiplegia affecting left nondominant side (Multi), History of CVA with residual deficit, chronic kidney disease, Hyperlipidemia, Hypertension, Insulin dependent type 2 diabetes mellitus (Multi), and Osteoarthritis.    Surgical History  He has a past surgical history that includes Rotator cuff repair (Right); Total knee arthroplasty (Left); Cataract extraction; Tonsillectomy; Esophagogastroduodenoscopy; and Colonoscopy.     Social History  He reports that he has been smoking cigarettes. He started smoking about 65 years ago. He has a 33 pack-year smoking history. He has been exposed to tobacco smoke. He has never used smokeless tobacco. He reports that he does not currently use alcohol. He reports that he does not use drugs.    Family History  Family History   Problem Relation Name Age of Onset    Cancer Mother      Brain cancer Father      Cancer Sister      Stroke Maternal Grandmother          Allergies  Patient has no known allergies.    Review of Systems  Per HPI     Physical Exam  - Alert, oriented x3  - Cardiopulmonary: Regular rhythm, normal heart sounds, clear breath sounds  - Abdomen: Soft, non-tender, normal bowel sounds  - : Three-way Franco catheter with CBI running.  Manual  "irrigation performed with return of light pink urine.  One small clot was removed.  - Extremities: 2+ bilateral lower extremity edema  - Neuro: Left-sided hemiplegia     Last Recorded Vitals  Blood pressure 106/54, pulse 65, temperature 36.4 °C (97.5 °F), temperature source Temporal, resp. rate 16, height 1.778 m (5' 10\"), weight 88.1 kg (194 lb 3.6 oz), SpO2 99%.    Relevant Results  Labs:  - Hgb 10.8 g/dL, Hct 35.2%  - WBC 4.3, Plt 250  - Na 135, K 4.0, Cr 0.46  - INR 1.2, PT 13.1       Assessment/Plan     Mr. Shaikh presents with recurrent gross hematuria requiring continuous bladder irrigation.  Bleeding is likely in the setting of his antiplatelet agent which is being managed with cardiology.  This has been held since 12/4/2024.  His hematuria continues to improve during this time.  He has chronic indwelling Franco catheter for retention.    1. Gross Hematuria  -Clamp CBI, transition to q4h manual irrigation by nursing.   - Hold anticoagulation  - Monitor H&H  - Transfuse if Hgb < 7.0  - CT abdomen/pelvis ordered  -He will require a cystoscopy to complete evaluation for the gross hematuria however this can be done as an outpatient    2. Chronic Franco Catheter  - Maintain catheter  - Monitor I&Os  - Continue tamsulosin and the plan is to wean him off the catheter.  If the patient would like to continue having the catheter then there is no utility for tamsulosin    3. Constipation  - Continue bisacodyl, polyethylene glycol, sennosides  - Monitor bowel movements    4. Code Status: DNR/DNI      I spent 45 minutes in the professional and overall care of this patient.      Leticia Bills MD    "

## 2024-12-08 NOTE — PROGRESS NOTES
Efra Shaikh is a 69 y.o. male on day 1 of admission presenting with Hematuria.    Subjective   No cp/SOB       Objective         Current Facility-Administered Medications:     acetaminophen (Tylenol) tablet 650 mg, 650 mg, oral, q6h PRN, Indigo Marcia, APRN-CNP, 650 mg at 12/08/24 0851    bisacodyl (Dulcolax) EC tablet 10 mg, 10 mg, oral, Daily, Indigo Marcia, APRN-CNP, 10 mg at 12/08/24 0851    dextrose 50 % injection 12.5 g, 12.5 g, intravenous, q15 min PRN, Indigo Marcia, APRN-CNP    dextrose 50 % injection 25 g, 25 g, intravenous, q15 min PRN, Indigo Marcia, APRN-CNP    glucagon (Glucagen) injection 1 mg, 1 mg, intramuscular, q15 min PRN, Indigo Marcia, APRN-CNP    glucagon (Glucagen) injection 1 mg, 1 mg, intramuscular, q15 min PRN, Indigo Marcia, APRN-CNP    glycine 1.5 % irrigation solution 3,000 mL, 3,000 mL, irrigation, Continuous, Brenda Gottlieb PA-C, 3,000 mL at 12/07/24 1748    insulin lispro injection 0-10 Units, 0-10 Units, subcutaneous, Before meals & nightly, Indigo Marcia, APRN-CNP, 2 Units at 12/08/24 1322    melatonin tablet 3 mg, 3 mg, oral, Nightly PRN, Indigo Marcia, APRN-CNP, 3 mg at 12/07/24 2126    nicotine (Nicoderm CQ) 21 mg/24 hr patch 1 patch, 1 patch, transdermal, Daily, 1 patch at 12/08/24 0856 **FOLLOWED BY** [START ON 1/18/2025] nicotine (Nicoderm CQ) 14 mg/24 hr patch 1 patch, 1 patch, transdermal, Daily **FOLLOWED BY** [START ON 2/1/2025] nicotine (Nicoderm CQ) 7 mg/24 hr patch 1 patch, 1 patch, transdermal, Daily, Indigo Marcia, APRN-CNP    pantoprazole (ProtoNix) EC tablet 40 mg, 40 mg, oral, Daily before breakfast, Indigo Marcia, APRN-CNP, 40 mg at 12/08/24 0614    polyethylene glycol (Glycolax, Miralax) packet 17 g, 17 g, oral, Daily, Indigo Marcia, APRN-CNP, 17 g at 12/08/24 0852    sennosides (Senokot) tablet 17.2 mg, 2 tablet, oral, Daily, Indigo Marcia, APRN-CNP, 17.2 mg at 12/08/24 0852    tamsulosin (Flomax) 24 hr capsule 0.4 mg, 0.4 mg, oral, Daily, Indigo Marcia, APRN-CNP, 0.4 mg at  "12/08/24 0852       Physical Exam  HENT:      Head: Normocephalic.   Eyes:      Conjunctiva/sclera: Conjunctivae normal.   Cardiovascular:      Rate and Rhythm: Regular rhythm.   Pulmonary:      Breath sounds: Normal breath sounds.   Abdominal:      General: Bowel sounds are normal.      Palpations: Abdomen is soft.   Musculoskeletal:         General: Normal range of motion.   Skin:     General: Skin is warm and dry.   Neurological:      General: No focal deficit present.      Mental Status: He is alert.   Psychiatric:         Behavior: Behavior normal.           Last Recorded Vitals  Blood pressure 128/68, pulse 80, temperature 36.2 °C (97.2 °F), temperature source Temporal, resp. rate 18, height 1.778 m (5' 10\"), weight 88.1 kg (194 lb 3.6 oz), SpO2 94%.  Intake/Output last 3 Shifts:  I/O last 3 completed shifts:  In: 720 (8.2 mL/kg) [P.O.:720]  Out: 5700 (64.7 mL/kg) [Urine:5700 (1.8 mL/kg/hr)]  Weight: 88.1 kg     Labs:       Results for orders placed or performed during the hospital encounter of 12/07/24 (from the past 24 hours)   Urinalysis with Reflex Culture and Microscopic   Result Value Ref Range    Color, Urine Colorless (N) Light-Yellow, Yellow, Dark-Yellow    Appearance, Urine Clear Clear    Specific Gravity, Urine 1.011 1.005 - 1.035    pH, Urine 6.5 5.0, 5.5, 6.0, 6.5, 7.0, 7.5, 8.0    Protein, Urine NEGATIVE NEGATIVE, 10 (TRACE), 20 (TRACE) mg/dL    Glucose, Urine Normal Normal mg/dL    Blood, Urine OVER (3+) (A) NEGATIVE    Ketones, Urine NEGATIVE NEGATIVE mg/dL    Bilirubin, Urine NEGATIVE NEGATIVE    Urobilinogen, Urine Normal Normal mg/dL    Nitrite, Urine NEGATIVE NEGATIVE    Leukocyte Esterase, Urine NEGATIVE NEGATIVE   Extra Urine Gray Tube   Result Value Ref Range    Extra Tube Hold for add-ons.    Urinalysis Microscopic   Result Value Ref Range    WBC, Urine 1-5 1-5, NONE /HPF    RBC, Urine 3-5 NONE, 1-2, 3-5 /HPF   POCT GLUCOSE   Result Value Ref Range    POCT Glucose 179 (H) 74 - 99 mg/dL "   POCT GLUCOSE   Result Value Ref Range    POCT Glucose 192 (H) 74 - 99 mg/dL   CBC   Result Value Ref Range    WBC 3.9 (L) 4.4 - 11.3 x10*3/uL    nRBC 0.0 0.0 - 0.0 /100 WBCs    RBC 3.90 (L) 4.50 - 5.90 x10*6/uL    Hemoglobin 10.6 (L) 13.5 - 17.5 g/dL    Hematocrit 32.6 (L) 41.0 - 52.0 %    MCV 84 80 - 100 fL    MCH 27.2 26.0 - 34.0 pg    MCHC 32.5 32.0 - 36.0 g/dL    RDW 16.3 (H) 11.5 - 14.5 %    Platelets 249 150 - 450 x10*3/uL   Comprehensive Metabolic Panel   Result Value Ref Range    Glucose 169 (H) 74 - 99 mg/dL    Sodium 134 (L) 136 - 145 mmol/L    Potassium 4.1 3.5 - 5.3 mmol/L    Chloride 99 98 - 107 mmol/L    Bicarbonate 27 21 - 32 mmol/L    Anion Gap 12 10 - 20 mmol/L    Urea Nitrogen 19 6 - 23 mg/dL    Creatinine 0.56 0.50 - 1.30 mg/dL    eGFR >90 >60 mL/min/1.73m*2    Calcium 9.3 8.6 - 10.3 mg/dL    Albumin 3.9 3.4 - 5.0 g/dL    Alkaline Phosphatase 57 33 - 136 U/L    Total Protein 6.2 (L) 6.4 - 8.2 g/dL    AST 8 (L) 9 - 39 U/L    Bilirubin, Total 0.5 0.0 - 1.2 mg/dL    ALT 11 10 - 52 U/L   Magnesium   Result Value Ref Range    Magnesium 1.79 1.60 - 2.40 mg/dL   Phosphorus   Result Value Ref Range    Phosphorus 4.3 2.5 - 4.9 mg/dL   POCT GLUCOSE   Result Value Ref Range    POCT Glucose 183 (H) 74 - 99 mg/dL   POCT GLUCOSE   Result Value Ref Range    POCT Glucose 171 (H) 74 - 99 mg/dL              Assessment/Plan   Principal Problem:    Hematuria  Active Problems:    Hyponatremia    Chronic indwelling Franco catheter    Constipation        PLAN: Pt is slowly improving, c/w CBI, urine is clearing, monitor H&H and sodium level, appreciate input of urology, med list and labs reviewed, for now continue with the current Rx, follow closely.            Salvador Biswas MD

## 2024-12-08 NOTE — CARE PLAN
The patient's goals for the shift include sleep    The clinical goals for the shift include remain free of falls and injury    Problem: Skin  Goal: Participates in plan/prevention/treatment measures  Outcome: Progressing  Goal: Prevent/manage excess moisture  Outcome: Progressing  Goal: Prevent/minimize sheer/friction injuries  Outcome: Progressing  Goal: Promote/optimize nutrition  Outcome: Progressing  Goal: Promote skin healing  Outcome: Progressing     Problem: Fall/Injury  Goal: Not fall by end of shift  Outcome: Progressing  Goal: Be free from injury by end of the shift  Outcome: Progressing  Goal: Verbalize understanding of personal risk factors for fall in the hospital  Outcome: Progressing     Problem: Pain  Goal: Takes deep breaths with improved pain control throughout the shift  Outcome: Progressing  Goal: Turns in bed with improved pain control throughout the shift  Outcome: Progressing  Goal: Walks with improved pain control throughout the shift  Outcome: Progressing  Goal: Performs ADL's with improved pain control throughout shift  Outcome: Progressing

## 2024-12-09 LAB
ANION GAP SERPL CALC-SCNC: 13 MMOL/L (ref 10–20)
BUN SERPL-MCNC: 18 MG/DL (ref 6–23)
CALCIUM SERPL-MCNC: 9.4 MG/DL (ref 8.6–10.3)
CHLORIDE SERPL-SCNC: 100 MMOL/L (ref 98–107)
CO2 SERPL-SCNC: 27 MMOL/L (ref 21–32)
CREAT SERPL-MCNC: 0.55 MG/DL (ref 0.5–1.3)
EGFRCR SERPLBLD CKD-EPI 2021: >90 ML/MIN/1.73M*2
ERYTHROCYTE [DISTWIDTH] IN BLOOD BY AUTOMATED COUNT: 16.2 % (ref 11.5–14.5)
GLUCOSE BLD MANUAL STRIP-MCNC: 171 MG/DL (ref 74–99)
GLUCOSE BLD MANUAL STRIP-MCNC: 175 MG/DL (ref 74–99)
GLUCOSE BLD MANUAL STRIP-MCNC: 224 MG/DL (ref 74–99)
GLUCOSE BLD MANUAL STRIP-MCNC: 243 MG/DL (ref 74–99)
GLUCOSE SERPL-MCNC: 169 MG/DL (ref 74–99)
HCT VFR BLD AUTO: 32.9 % (ref 41–52)
HGB BLD-MCNC: 10.5 G/DL (ref 13.5–17.5)
MAGNESIUM SERPL-MCNC: 1.81 MG/DL (ref 1.6–2.4)
MCH RBC QN AUTO: 26.7 PG (ref 26–34)
MCHC RBC AUTO-ENTMCNC: 31.9 G/DL (ref 32–36)
MCV RBC AUTO: 84 FL (ref 80–100)
NRBC BLD-RTO: 0 /100 WBCS (ref 0–0)
PHOSPHATE SERPL-MCNC: 4.3 MG/DL (ref 2.5–4.9)
PLATELET # BLD AUTO: 270 X10*3/UL (ref 150–450)
POTASSIUM SERPL-SCNC: 3.8 MMOL/L (ref 3.5–5.3)
RBC # BLD AUTO: 3.93 X10*6/UL (ref 4.5–5.9)
SODIUM SERPL-SCNC: 136 MMOL/L (ref 136–145)
WBC # BLD AUTO: 5.2 X10*3/UL (ref 4.4–11.3)

## 2024-12-09 PROCEDURE — 36415 COLL VENOUS BLD VENIPUNCTURE: CPT

## 2024-12-09 PROCEDURE — 85027 COMPLETE CBC AUTOMATED: CPT

## 2024-12-09 PROCEDURE — 1200000002 HC GENERAL ROOM WITH TELEMETRY DAILY

## 2024-12-09 PROCEDURE — 2500000002 HC RX 250 W HCPCS SELF ADMINISTERED DRUGS (ALT 637 FOR MEDICARE OP, ALT 636 FOR OP/ED): Performed by: PHYSICIAN ASSISTANT

## 2024-12-09 PROCEDURE — 84100 ASSAY OF PHOSPHORUS: CPT

## 2024-12-09 PROCEDURE — 2500000001 HC RX 250 WO HCPCS SELF ADMINISTERED DRUGS (ALT 637 FOR MEDICARE OP): Performed by: PHYSICIAN ASSISTANT

## 2024-12-09 PROCEDURE — 2500000002 HC RX 250 W HCPCS SELF ADMINISTERED DRUGS (ALT 637 FOR MEDICARE OP, ALT 636 FOR OP/ED)

## 2024-12-09 PROCEDURE — 99231 SBSQ HOSP IP/OBS SF/LOW 25: CPT | Performed by: NURSE PRACTITIONER

## 2024-12-09 PROCEDURE — 2500000004 HC RX 250 GENERAL PHARMACY W/ HCPCS (ALT 636 FOR OP/ED): Performed by: PHYSICIAN ASSISTANT

## 2024-12-09 PROCEDURE — S4991 NICOTINE PATCH NONLEGEND: HCPCS

## 2024-12-09 PROCEDURE — 83735 ASSAY OF MAGNESIUM: CPT

## 2024-12-09 PROCEDURE — 80048 BASIC METABOLIC PNL TOTAL CA: CPT | Performed by: NURSE PRACTITIONER

## 2024-12-09 PROCEDURE — 76705 ECHO EXAM OF ABDOMEN: CPT | Performed by: RADIOLOGY

## 2024-12-09 PROCEDURE — 2500000005 HC RX 250 GENERAL PHARMACY W/O HCPCS: Performed by: NURSE PRACTITIONER

## 2024-12-09 PROCEDURE — 82947 ASSAY GLUCOSE BLOOD QUANT: CPT

## 2024-12-09 PROCEDURE — 2500000001 HC RX 250 WO HCPCS SELF ADMINISTERED DRUGS (ALT 637 FOR MEDICARE OP)

## 2024-12-09 RX ORDER — GADOTERATE MEGLUMINE 376.9 MG/ML
17 INJECTION INTRAVENOUS
Status: DISCONTINUED | OUTPATIENT
Start: 2024-12-09 | End: 2024-12-11 | Stop reason: HOSPADM

## 2024-12-09 RX ORDER — ASPIRIN 81 MG/1
81 TABLET ORAL DAILY
Status: DISCONTINUED | OUTPATIENT
Start: 2024-12-10 | End: 2024-12-11 | Stop reason: HOSPADM

## 2024-12-09 ASSESSMENT — PAIN - FUNCTIONAL ASSESSMENT: PAIN_FUNCTIONAL_ASSESSMENT: 0-10

## 2024-12-09 ASSESSMENT — COGNITIVE AND FUNCTIONAL STATUS - GENERAL
WALKING IN HOSPITAL ROOM: TOTAL
HELP NEEDED FOR BATHING: A LOT
EATING MEALS: A LITTLE
TURNING FROM BACK TO SIDE WHILE IN FLAT BAD: A LOT
MOVING FROM LYING ON BACK TO SITTING ON SIDE OF FLAT BED WITH BEDRAILS: A LOT
STANDING UP FROM CHAIR USING ARMS: TOTAL
TOILETING: A LOT
PERSONAL GROOMING: A LITTLE
DAILY ACTIVITIY SCORE: 13
CLIMB 3 TO 5 STEPS WITH RAILING: TOTAL
MOVING TO AND FROM BED TO CHAIR: TOTAL
MOBILITY SCORE: 8
DRESSING REGULAR LOWER BODY CLOTHING: TOTAL
DRESSING REGULAR UPPER BODY CLOTHING: A LOT

## 2024-12-09 ASSESSMENT — PAIN SCALES - GENERAL
PAINLEVEL_OUTOF10: 0 - NO PAIN
PAINLEVEL_OUTOF10: 0 - NO PAIN

## 2024-12-09 NOTE — CARE PLAN
Problem: Obstructive: Kidney Stones, Hydronephrosis, BPH  Goal: Relieve obstruction  Outcome: Progressing     Problem: Skin  Goal: Participates in plan/prevention/treatment measures  12/9/2024 1801 by Alpa Galloway RN  Outcome: Met  Flowsheets (Taken 12/9/2024 1801)  Participates in plan/prevention/treatment measures: Elevate heels       Problem: Fall/Injury  Goal: Not fall by end of shift  Outcome: Met

## 2024-12-09 NOTE — NURSING NOTE
1530 Pt c/o constipation, attempted to have BM on bedpan all shift. Attempted fleets enema with difficulty due to large amount hard stool in rectum.     1800 Pt has had two XL soft brown BMs    1830 Franco has been clear yellow all shift, now dark fannie; Hand irrigated with clear return, no clots noted.

## 2024-12-09 NOTE — PROGRESS NOTES
Efra Shaikh 69 y.o. male    Subjective  Patient seen and examined this afternoon.  Franco catheter with yellow urine, no clots seen.  Per nursing, was irrigated overnight with no clots and has not required manual irrigation throughout the day today.  With no other complaints.     Objective  PHYSICAL EXAM:  Physical Exam  Vitals reviewed.   Constitutional:       General: He is awake.      Appearance: Normal appearance.   Cardiovascular:      Rate and Rhythm: Normal rate and regular rhythm.      Pulses: Normal pulses.      Heart sounds: Normal heart sounds.   Pulmonary:      Effort: Pulmonary effort is normal.      Breath sounds: Normal breath sounds and air entry.   Abdominal:      General: Abdomen is flat. There is no distension.      Palpations: Abdomen is soft.      Tenderness: There is no abdominal tenderness. There is no right CVA tenderness or left CVA tenderness.   Genitourinary:     Comments: Franco catheter with yellow urine, no clots.   Musculoskeletal:         General: Normal range of motion.      Cervical back: Normal range of motion.   Skin:     General: Skin is warm and dry.   Neurological:      General: No focal deficit present.      Mental Status: He is alert and oriented to person, place, and time.   Psychiatric:         Behavior: Behavior is cooperative.         Vital signs in last 24 hours:  Vitals:    12/09/24 1200   BP: 133/70   Pulse: 83   Resp: 16   Temp: 36.9 °C (98.4 °F)   SpO2: 97%         Intake/Output this shift:    Intake/Output Summary (Last 24 hours) at 12/9/2024 1532  Last data filed at 12/9/2024 0900  Gross per 24 hour   Intake --   Output 1890 ml   Net -1890 ml        Allergies:  No Known Allergies     Medications:  Scheduled medications  bisacodyl, 10 mg, oral, Daily  gadoterate meglumine, 17 mL, intravenous, Once in imaging  insulin lispro, 0-10 Units, subcutaneous, Before meals & nightly  nicotine, 1 patch, transdermal, Daily   Followed by  [START ON 1/18/2025] nicotine, 1  patch, transdermal, Daily   Followed by  [START ON 2/1/2025] nicotine, 1 patch, transdermal, Daily  pantoprazole, 40 mg, oral, Daily before breakfast  polyethylene glycol, 17 g, oral, Daily  sennosides, 2 tablet, oral, Daily  sodium phosphates, 1 enema, rectal, Once  tamsulosin, 0.4 mg, oral, Daily      Continuous medications  glycine, 3,000 mL      PRN medications  PRN medications: acetaminophen, dextrose, dextrose, glucagon, glucagon, melatonin       Labs:  Results for orders placed or performed during the hospital encounter of 12/07/24 (from the past 24 hours)   POCT GLUCOSE   Result Value Ref Range    POCT Glucose 156 (H) 74 - 99 mg/dL   Hemoglobin and hematocrit, blood   Result Value Ref Range    Hemoglobin 10.6 (L) 13.5 - 17.5 g/dL    Hematocrit 32.2 (L) 41.0 - 52.0 %   POCT GLUCOSE   Result Value Ref Range    POCT Glucose 199 (H) 74 - 99 mg/dL   CBC   Result Value Ref Range    WBC 5.2 4.4 - 11.3 x10*3/uL    nRBC 0.0 0.0 - 0.0 /100 WBCs    RBC 3.93 (L) 4.50 - 5.90 x10*6/uL    Hemoglobin 10.5 (L) 13.5 - 17.5 g/dL    Hematocrit 32.9 (L) 41.0 - 52.0 %    MCV 84 80 - 100 fL    MCH 26.7 26.0 - 34.0 pg    MCHC 31.9 (L) 32.0 - 36.0 g/dL    RDW 16.2 (H) 11.5 - 14.5 %    Platelets 270 150 - 450 x10*3/uL   Magnesium   Result Value Ref Range    Magnesium 1.81 1.60 - 2.40 mg/dL   Phosphorus   Result Value Ref Range    Phosphorus 4.3 2.5 - 4.9 mg/dL   Basic Metabolic Panel   Result Value Ref Range    Glucose 169 (H) 74 - 99 mg/dL    Sodium 136 136 - 145 mmol/L    Potassium 3.8 3.5 - 5.3 mmol/L    Chloride 100 98 - 107 mmol/L    Bicarbonate 27 21 - 32 mmol/L    Anion Gap 13 10 - 20 mmol/L    Urea Nitrogen 18 6 - 23 mg/dL    Creatinine 0.55 0.50 - 1.30 mg/dL    eGFR >90 >60 mL/min/1.73m*2    Calcium 9.4 8.6 - 10.3 mg/dL   POCT GLUCOSE   Result Value Ref Range    POCT Glucose 175 (H) 74 - 99 mg/dL   POCT GLUCOSE   Result Value Ref Range    POCT Glucose 171 (H) 74 - 99 mg/dL        Imaging:  US right upper quadrant    Result  Date: 12/9/2024  Interpreted By:  Ambrocio Nelson, STUDY: US RIGHT UPPER QUADRANT;  12/9/2024 8:45 am   INDICATION: Signs/Symptoms:Distended gallbladder per CT scan.     COMPARISON: None.   ACCESSION NUMBER(S): [id number]   ORDERING CLINICIAN: BRAD SNYDER   TECHNIQUE: Multiple images of the right upper quadrant were obtained.   FINDINGS: LIVER: The liver measures 16.1 cm . No focal lesions identified.     GALLBLADDER: The gallbladder is distended. No significant wall thickening is identified. There are echogenic mural nodules circumferentially measuring up to 27 mm. Possible vascularity is demonstrated in the dominant nodule. Sonographic Martino's sign is negative.     BILE DUCTS: No evidence of intra or extrahepatic biliary dilatation is identified; the common bile duct measures 0.4 cm.   PANCREAS: Obscured by bowel gas.   RIGHT KIDNEY: The right kidney measures 10.7 cm in length. The renal cortical echogenicity and thickness are within normal limit.  No hydronephrosis or renal calculi are seen. 18 mm cyst is noted.   No right upper quadrant free fluid identified.         Distended gallbladder. No additional findings to indicate acute cholecystitis otherwise. No wall thickening, pericholecystic fluid, or sonographic Martino sign is identified.   Circumferential mural nodularity involving the gallbladder which could reflect areas of inspissated sludge, although masses are not entirely excluded. Suggest MR abdomen with and without contrast for further evaluation.   MACRO: None   Signed by: Ambrocio Nelson 12/9/2024 10:50 AM Dictation workstation:   [id number]    ECG 12 Lead    Result Date: 12/7/2024  Sinus bradycardia Moderate voltage criteria for LVH, may be normal variant No previous ECGs available Reconfirmed by Giovanni Montero ([id number]) on 12/7/2024 3:37:50 PM    CT abdomen pelvis wo IV contrast    Result Date: 12/7/2024  Interpreted By:  Ruddy Garcia, STUDY: CT ABDOMEN PELVIS WO IV CONTRAST; 12/7/2024 8:17 am    INDICATION: Signs/Symptoms:hematuria with clots   COMPARISON: None   ACCESSION NUMBER(S): [id number]   ORDERING CLINICIAN: DARIUSZ OVALLES   TECHNIQUE: Spiral axial unenhanced images were obtained from the upper abdomen to the symphysis pubis. Oral contrast was not administered.   All CT examinations are performed with one or more of the following dose reduction techniques: Automated Exposure Control, adjustment of mA and/or kV according to patient size, or use of iterative reconstruction techniques.   FINDINGS: Lung bases: The lung bases are clear. Liver: Normal in size without focal lesions. Gallbladder: Distended gallbladder, measuring up to 5.6 cm in transverse dimension, with possible gallstones and sludge. Spleen: Normal in size without focal lesions. Pancreas: Unremarkable. Adrenal glands: No focal lesions. The kidneys are normal in size and position. Complex cystic lesion measuring 9.4 cm is noted in the upper pole of the left kidney containing partially calcified septations. There also a couple of smaller cysts in the left kidney measuring up to 2 cm. There are no renal calculi or hydronephrosis. No abnormal calcifications are seen within the course of the ureters or within the bladder. The bladder is decompressed by a Franco catheter, with apparent wall thickening. The prostate gland is enlarged, measuring 6.5 cm, projecting in the bladder floor.   Copious amount of fecal material is noted throughout the colon, suggestive of constipation, without bowel obstruction. The appendix is seen and appears normal. There is no free air or free fluid in the abdomen and pelvis. Atherosclerotic calcifications involve the aorta, without focal aneurysm. There is disc disease involving the lower lumbar spine, especially the L5-S1 level, with minimal anterolisthesis of the L5 over the S1 vertebra.       1. Large complex cystic lesion involving the upper pole of the left kidney, most likely septated/partially calcified cysts.  In the setting of hematuria this finding can be further evaluated with nonemergent MRI. 2. Suboptimally evaluated decompressed urinary bladder. In the setting of hematuria further evaluation can be obtained with cystoscopy. 3. Distended gallbladder containing gallstones and possible sludge; this can be further evaluated with ultrasound if clinically indicated.   Signed by: Ruddy Garcia 12/7/2024 10:27 AM Dictation workstation:   [id number]           Plan    #Gross hematuria   - Can manually irrigate Q4 and PRN as needed to maintain patency of baldwin   - OK resume anticoagulation tomorrow 12/10  - CT A/P completed - results as above   - H&H stable     #Urinary retention  - Chronic baldwin catheter    - Recommend cystoscopy as outpatient to evaluate hematuria.      Plan of care discussed with Dr. Allen and urology will sign off.  Please call with any concerns.     LIVE Jimenes-CNP    I spent 15 minutes in the professional and overall care of this patient.

## 2024-12-10 LAB
ANION GAP SERPL CALC-SCNC: 14 MMOL/L (ref 10–20)
BUN SERPL-MCNC: 22 MG/DL (ref 6–23)
CALCIUM SERPL-MCNC: 9.5 MG/DL (ref 8.6–10.3)
CHLORIDE SERPL-SCNC: 101 MMOL/L (ref 98–107)
CO2 SERPL-SCNC: 27 MMOL/L (ref 21–32)
CREAT SERPL-MCNC: 0.6 MG/DL (ref 0.5–1.3)
EGFRCR SERPLBLD CKD-EPI 2021: >90 ML/MIN/1.73M*2
ERYTHROCYTE [DISTWIDTH] IN BLOOD BY AUTOMATED COUNT: 16.2 % (ref 11.5–14.5)
GLUCOSE BLD MANUAL STRIP-MCNC: 168 MG/DL (ref 74–99)
GLUCOSE BLD MANUAL STRIP-MCNC: 183 MG/DL (ref 74–99)
GLUCOSE BLD MANUAL STRIP-MCNC: 213 MG/DL (ref 74–99)
GLUCOSE SERPL-MCNC: 157 MG/DL (ref 74–99)
HCT VFR BLD AUTO: 33.9 % (ref 41–52)
HGB BLD-MCNC: 11 G/DL (ref 13.5–17.5)
MAGNESIUM SERPL-MCNC: 1.97 MG/DL (ref 1.6–2.4)
MCH RBC QN AUTO: 27.4 PG (ref 26–34)
MCHC RBC AUTO-ENTMCNC: 32.4 G/DL (ref 32–36)
MCV RBC AUTO: 84 FL (ref 80–100)
NRBC BLD-RTO: 0 /100 WBCS (ref 0–0)
PHOSPHATE SERPL-MCNC: 4.3 MG/DL (ref 2.5–4.9)
PLATELET # BLD AUTO: 264 X10*3/UL (ref 150–450)
POTASSIUM SERPL-SCNC: 4.1 MMOL/L (ref 3.5–5.3)
RBC # BLD AUTO: 4.02 X10*6/UL (ref 4.5–5.9)
SODIUM SERPL-SCNC: 138 MMOL/L (ref 136–145)
WBC # BLD AUTO: 6.6 X10*3/UL (ref 4.4–11.3)

## 2024-12-10 PROCEDURE — 2500000002 HC RX 250 W HCPCS SELF ADMINISTERED DRUGS (ALT 637 FOR MEDICARE OP, ALT 636 FOR OP/ED): Performed by: NURSE PRACTITIONER

## 2024-12-10 PROCEDURE — 82947 ASSAY GLUCOSE BLOOD QUANT: CPT

## 2024-12-10 PROCEDURE — 2500000001 HC RX 250 WO HCPCS SELF ADMINISTERED DRUGS (ALT 637 FOR MEDICARE OP)

## 2024-12-10 PROCEDURE — 85027 COMPLETE CBC AUTOMATED: CPT

## 2024-12-10 PROCEDURE — 83735 ASSAY OF MAGNESIUM: CPT

## 2024-12-10 PROCEDURE — 36415 COLL VENOUS BLD VENIPUNCTURE: CPT | Performed by: NURSE PRACTITIONER

## 2024-12-10 PROCEDURE — 1200000002 HC GENERAL ROOM WITH TELEMETRY DAILY

## 2024-12-10 PROCEDURE — 2500000004 HC RX 250 GENERAL PHARMACY W/ HCPCS (ALT 636 FOR OP/ED)

## 2024-12-10 PROCEDURE — 2500000004 HC RX 250 GENERAL PHARMACY W/ HCPCS (ALT 636 FOR OP/ED): Performed by: PHYSICIAN ASSISTANT

## 2024-12-10 PROCEDURE — 2500000001 HC RX 250 WO HCPCS SELF ADMINISTERED DRUGS (ALT 637 FOR MEDICARE OP): Performed by: PHYSICIAN ASSISTANT

## 2024-12-10 PROCEDURE — 84100 ASSAY OF PHOSPHORUS: CPT

## 2024-12-10 PROCEDURE — 2500000002 HC RX 250 W HCPCS SELF ADMINISTERED DRUGS (ALT 637 FOR MEDICARE OP, ALT 636 FOR OP/ED)

## 2024-12-10 PROCEDURE — S4991 NICOTINE PATCH NONLEGEND: HCPCS

## 2024-12-10 PROCEDURE — 2500000002 HC RX 250 W HCPCS SELF ADMINISTERED DRUGS (ALT 637 FOR MEDICARE OP, ALT 636 FOR OP/ED): Performed by: PHYSICIAN ASSISTANT

## 2024-12-10 PROCEDURE — 2500000001 HC RX 250 WO HCPCS SELF ADMINISTERED DRUGS (ALT 637 FOR MEDICARE OP): Performed by: NURSE PRACTITIONER

## 2024-12-10 PROCEDURE — 80048 BASIC METABOLIC PNL TOTAL CA: CPT | Performed by: NURSE PRACTITIONER

## 2024-12-10 RX ORDER — NICOTINE 7MG/24HR
1 PATCH, TRANSDERMAL 24 HOURS TRANSDERMAL DAILY
Start: 2025-02-01 | End: 2025-02-15

## 2024-12-10 RX ORDER — IBUPROFEN 200 MG
1 TABLET ORAL DAILY
Start: 2024-12-11 | End: 2025-01-18

## 2024-12-10 RX ORDER — IBUPROFEN 200 MG
1 TABLET ORAL DAILY
Start: 2025-01-18 | End: 2025-02-01

## 2024-12-10 ASSESSMENT — COGNITIVE AND FUNCTIONAL STATUS - GENERAL
STANDING UP FROM CHAIR USING ARMS: TOTAL
MOVING FROM LYING ON BACK TO SITTING ON SIDE OF FLAT BED WITH BEDRAILS: TOTAL
CLIMB 3 TO 5 STEPS WITH RAILING: TOTAL
TOILETING: TOTAL
MOVING TO AND FROM BED TO CHAIR: TOTAL
DRESSING REGULAR LOWER BODY CLOTHING: TOTAL
WALKING IN HOSPITAL ROOM: TOTAL
DRESSING REGULAR UPPER BODY CLOTHING: TOTAL
DAILY ACTIVITIY SCORE: 6
EATING MEALS: TOTAL
HELP NEEDED FOR BATHING: TOTAL
PERSONAL GROOMING: TOTAL
MOBILITY SCORE: 6
TURNING FROM BACK TO SIDE WHILE IN FLAT BAD: TOTAL

## 2024-12-10 ASSESSMENT — PAIN - FUNCTIONAL ASSESSMENT
PAIN_FUNCTIONAL_ASSESSMENT: 0-10

## 2024-12-10 ASSESSMENT — PAIN SCALES - GENERAL
PAINLEVEL_OUTOF10: 3
PAINLEVEL_OUTOF10: 5 - MODERATE PAIN
PAINLEVEL_OUTOF10: 4
PAINLEVEL_OUTOF10: 4

## 2024-12-10 ASSESSMENT — ACTIVITIES OF DAILY LIVING (ADL): LACK_OF_TRANSPORTATION: NO

## 2024-12-10 NOTE — PROGRESS NOTES
Physical Therapy                 Therapy Communication Note    Patient Name: Efra Shaikh  MRN: [id number]  Department: [facility] 3 S  Room: 3123/3123-A  Today's Date: 12/10/2024     Discipline: Physical Therapy    Comment:  PT orders received, chart reviewed.  Pt is LTC resident at [facility] and will return when medically ready.  Pt without acute skilled therapy needs at this time.  Will DC OT orders.

## 2024-12-10 NOTE — PROGRESS NOTES
12/10/24 1032   Discharge Planning   Living Arrangements Alone   Support Systems Children   Assistance Needed yes   Type of Residence Nursing home/residential care   Home or Post Acute Services Post acute facilities (Rehab/SNF/etc)   Type of Post Acute Facility Services Long term care   Expected Discharge Disposition Inter   Financial Resource Strain   How hard is it for you to pay for the very basics like food, housing, medical care, and heating? Not hard   Housing Stability   In the last 12 months, was there a time when you were not able to pay the mortgage or rent on time? N   At any time in the past 12 months, were you homeless or living in a shelter (including now)? N   Transportation Needs   In the past 12 months, has lack of transportation kept you from medical appointments or from getting medications? no   In the past 12 months, has lack of transportation kept you from meetings, work, or from getting things needed for daily living? No     Voice left for Cristina Sebastian at [facility] to verify patient's admission status Long term or skilled. Facility number; [phone]  Fax: [phone]. Voice mail left for emergency contact, daughter Trina at . Message sent to [facility] to send a return referral to [facility] to return today.     1150: I spoke with patient's daughter, Trina  and she confirmed her dad will return to [facility] (fax: [phone]). Facility can accept the patient's return today.       1330: Stretcher transport arranged for [facility] at 4:30 . Patient's daughter, Trina notified, bedside and facility notified.

## 2024-12-10 NOTE — PROGRESS NOTES
Efra Shaikh is a 69 y.o. male on day 2 of admission presenting with Hematuria.    Subjective   No cp       Objective         Current Facility-Administered Medications:     acetaminophen (Tylenol) tablet 650 mg, 650 mg, oral, q6h PRN, LIVE Garcias-CNP, 650 mg at 12/08/24 1440    [START ON 12/10/2024] aspirin EC tablet 81 mg, 81 mg, oral, Daily, SABINA Acosta    atorvastatin (Lipitor) tablet 40 mg, 40 mg, oral, Nightly, Brenda Gottlieb PA-C    bisacodyl (Dulcolax) EC tablet 10 mg, 10 mg, oral, Daily, LIVE Garcias-CNP, 10 mg at 12/09/24 0928    dextrose 50 % injection 12.5 g, 12.5 g, intravenous, q15 min PRN, Indigo Marcia, APRN-CNP    dextrose 50 % injection 25 g, 25 g, intravenous, q15 min PRN, Indigo Schultzi, APRN-CNP    gabapentin (Neurontin) capsule 100 mg, 100 mg, oral, TID, Brenda Gottlieb PA-C, 100 mg at 12/09/24 1733    gadoterate meglumine (Dotarem) 0.5 mmol/mL contrast injection 17 mL, 17 mL, intravenous, Once in imaging, Salvador Biswas MD    glucagon (Glucagen) injection 1 mg, 1 mg, intramuscular, q15 min PRN, Indigo Marcia, APRN-CNP    glucagon (Glucagen) injection 1 mg, 1 mg, intramuscular, q15 min PRN, LIVE Garcias-CNP    glycine 1.5 % irrigation solution 3,000 mL, 3,000 mL, irrigation, Continuous, Brenda Gottlieb PA-C, 3,000 mL at 12/07/24 1748    insulin glargine (Lantus) injection 15 Units, 15 Units, subcutaneous, Nightly, Brenda Gottlieb PA-C    insulin lispro injection 0-10 Units, 0-10 Units, subcutaneous, Before meals & nightly, SABINA Garcias, 4 Units at 12/09/24 1736    magnesium hydroxide (Milk of Magnesia) 400 mg/5 mL suspension 5 mL, 5 mL, oral, Daily PRN, Brenda Gottlieb PA-C    magnesium oxide (Mag-Ox) tablet 400 mg, 400 mg, oral, Daily, Brenda Gottlieb PA-C, 400 mg at 12/09/24 1733    melatonin tablet 3 mg, 3 mg, oral, Nightly PRN, SABINA Garcias, 3 mg at 12/08/24 2112    metoprolol succinate XL (Toprol-XL) 24 hr tablet 50 mg, 50  "mg, oral, Daily, Brenda Gottlieb PA-C, 50 mg at 12/09/24 1733    nicotine (Nicoderm CQ) 21 mg/24 hr patch 1 patch, 1 patch, transdermal, Daily, 1 patch at 12/09/24 0928 **FOLLOWED BY** [START ON 1/18/2025] nicotine (Nicoderm CQ) 14 mg/24 hr patch 1 patch, 1 patch, transdermal, Daily **FOLLOWED BY** [START ON 2/1/2025] nicotine (Nicoderm CQ) 7 mg/24 hr patch 1 patch, 1 patch, transdermal, Daily, Indigo Marcia, APRN-CNP    pantoprazole (ProtoNix) EC tablet 40 mg, 40 mg, oral, Daily before breakfast, Indigo Marcia, APRN-CNP, 40 mg at 12/09/24 0603    polyethylene glycol (Glycolax, Miralax) packet 17 g, 17 g, oral, Daily, Indigo Marcia, APRN-CNP, 17 g at 12/08/24 0852    sennosides (Senokot) tablet 17.2 mg, 2 tablet, oral, Daily, Indigo Marcia, APRN-CNP, 17.2 mg at 12/09/24 0928    tamsulosin (Flomax) 24 hr capsule 0.4 mg, 0.4 mg, oral, Daily, Indigo Marcia, APRN-CNP, 0.4 mg at 12/09/24 0928    [START ON 12/10/2024] ticagrelor (Brilinta) tablet 90 mg, 90 mg, oral, BID, LIVE Acosta-CNP    traZODone (Desyrel) tablet 25 mg, 25 mg, oral, Nightly, Brenda Gottlieb PA-C       Physical Exam  HENT:      Head: Normocephalic.   Eyes:      Conjunctiva/sclera: Conjunctivae normal.   Cardiovascular:      Rate and Rhythm: Regular rhythm.   Pulmonary:      Breath sounds: Normal breath sounds.   Abdominal:      General: Bowel sounds are normal.      Palpations: Abdomen is soft.   Musculoskeletal:         General: Normal range of motion.   Skin:     General: Skin is warm and dry.   Neurological:      General: No focal deficit present.      Mental Status: He is alert.   Psychiatric:         Behavior: Behavior normal.           Last Recorded Vitals  Blood pressure (!) 136/93, pulse 94, temperature 35.9 °C (96.6 °F), temperature source Temporal, resp. rate 19, height 1.778 m (5' 10\"), weight 86.3 kg (190 lb 3.2 oz), SpO2 94%.  Intake/Output last 3 Shifts:  I/O last 3 completed shifts:  In: 360 (4.2 mL/kg) [P.O.:360]  Out: 3490 (40.5 " mL/kg) [Urine:3490 (1.1 mL/kg/hr)]  Weight: 86.3 kg     Labs:       Results for orders placed or performed during the hospital encounter of 12/07/24 (from the past 24 hours)   POCT GLUCOSE   Result Value Ref Range    POCT Glucose 199 (H) 74 - 99 mg/dL   CBC   Result Value Ref Range    WBC 5.2 4.4 - 11.3 x10*3/uL    nRBC 0.0 0.0 - 0.0 /100 WBCs    RBC 3.93 (L) 4.50 - 5.90 x10*6/uL    Hemoglobin 10.5 (L) 13.5 - 17.5 g/dL    Hematocrit 32.9 (L) 41.0 - 52.0 %    MCV 84 80 - 100 fL    MCH 26.7 26.0 - 34.0 pg    MCHC 31.9 (L) 32.0 - 36.0 g/dL    RDW 16.2 (H) 11.5 - 14.5 %    Platelets 270 150 - 450 x10*3/uL   Magnesium   Result Value Ref Range    Magnesium 1.81 1.60 - 2.40 mg/dL   Phosphorus   Result Value Ref Range    Phosphorus 4.3 2.5 - 4.9 mg/dL   Basic Metabolic Panel   Result Value Ref Range    Glucose 169 (H) 74 - 99 mg/dL    Sodium 136 136 - 145 mmol/L    Potassium 3.8 3.5 - 5.3 mmol/L    Chloride 100 98 - 107 mmol/L    Bicarbonate 27 21 - 32 mmol/L    Anion Gap 13 10 - 20 mmol/L    Urea Nitrogen 18 6 - 23 mg/dL    Creatinine 0.55 0.50 - 1.30 mg/dL    eGFR >90 >60 mL/min/1.73m*2    Calcium 9.4 8.6 - 10.3 mg/dL   POCT GLUCOSE   Result Value Ref Range    POCT Glucose 175 (H) 74 - 99 mg/dL   POCT GLUCOSE   Result Value Ref Range    POCT Glucose 171 (H) 74 - 99 mg/dL   POCT GLUCOSE   Result Value Ref Range    POCT Glucose 224 (H) 74 - 99 mg/dL              Assessment/Plan   Principal Problem:    Hematuria  Active Problems:    Hyponatremia    Chronic indwelling Franco catheter    Constipation        PLAN: Pt is slowly improving, med list and labs reviewed, for now c/w current Rx, follow closely, appreciate input of urology.       Salvador Biswas MD

## 2024-12-10 NOTE — DISCHARGE INSTRUCTIONS
Please follow-up with your primary care physician for referral to gastrointestinal physician Deion Smith MD (did your EGD in 6/2024) in your area for further work-up on your gallbladder as your ultrasound of your gallbladder showed distended gallbladder, no acute cholecystitis, and no slaughter's sign. However, there were nodules seen that recommends further evaluation. You were unable to tolerate the MR abdomen.

## 2024-12-10 NOTE — CARE PLAN
Problem: Skin  Goal: Prevent/manage excess moisture  Outcome: Progressing  Goal: Prevent/minimize sheer/friction injuries  Outcome: Progressing  Flowsheets (Taken 12/10/2024 5495)  Prevent/minimize sheer/friction injuries:   Use pull sheet   HOB 30 degrees or less   Turn/reposition every 2 hours/use positioning/transfer devices  Goal: Promote/optimize nutrition  Outcome: Progressing  Goal: Promote skin healing  Outcome: Progressing     Problem: Fall/Injury  Goal: Be free from injury by end of the shift  Outcome: Progressing  Goal: Verbalize understanding of personal risk factors for fall in the hospital  Outcome: Progressing     Problem: Pain  Goal: Takes deep breaths with improved pain control throughout the shift  Outcome: Progressing  Goal: Turns in bed with improved pain control throughout the shift  Outcome: Progressing  Goal: Walks with improved pain control throughout the shift  Outcome: Progressing  Goal: Performs ADL's with improved pain control throughout shift  Outcome: Progressing     Problem: Obstructive: Kidney Stones, Hydronephrosis, BPH  Goal: Achieves normovolemia  Outcome: Progressing  Goal: Relieve obstruction  Outcome: Progressing   The patient's goals for the shift include sleep    The clinical goals for the shift include Pt will remain free from hematuria

## 2024-12-10 NOTE — CARE PLAN
Medical house staff was asked to assist with the patient's discharge from the hospital. I have been uninvolved in this patient's care up to this point. The attending physician has given the order for the patient to be discharged from the hospital. The attending physician has left the hospital today and has asked for my assistance with the patient's discharge. Medications continued as per ordered. Spoke with the patient's daughter and updated her on the patient's condition and need for follow-up with GI for possible nodules on US of gallbladder and urology for cystoscopy outpatient. I answered her questions and she voiced understanding. The patient is being provided discharge orders in their discharge packet as instructed by the attending.

## 2024-12-11 VITALS
BODY MASS INDEX: 26.13 KG/M2 | SYSTOLIC BLOOD PRESSURE: 117 MMHG | HEART RATE: 70 BPM | RESPIRATION RATE: 18 BRPM | OXYGEN SATURATION: 94 % | HEIGHT: 70 IN | TEMPERATURE: 98.6 F | DIASTOLIC BLOOD PRESSURE: 63 MMHG | WEIGHT: 182.54 LBS

## 2024-12-11 LAB — GLUCOSE BLD MANUAL STRIP-MCNC: 154 MG/DL (ref 74–99)

## 2024-12-11 ASSESSMENT — PAIN SCALES - GENERAL: PAINLEVEL_OUTOF10: 0 - NO PAIN

## 2024-12-11 NOTE — NURSING NOTE
Physicians picked pt up to take him back to facility in [place]. Report was called to facility around 2130 prior to discharge. Vitals were obtained and IV was removed.

## 2024-12-11 NOTE — CARE PLAN
Problem: Skin  Goal: Prevent/manage excess moisture  Outcome: Progressing  Goal: Prevent/minimize sheer/friction injuries  Outcome: Progressing  Flowsheets (Taken 12/11/2024 0024)  Prevent/minimize sheer/friction injuries:   Turn/reposition every 2 hours/use positioning/transfer devices   HOB 30 degrees or less   Use pull sheet  Goal: Promote/optimize nutrition  Outcome: Progressing  Goal: Promote skin healing  Outcome: Progressing     Problem: Fall/Injury  Goal: Be free from injury by end of the shift  Outcome: Progressing  Goal: Verbalize understanding of personal risk factors for fall in the hospital  Outcome: Progressing     Problem: Pain  Goal: Takes deep breaths with improved pain control throughout the shift  Outcome: Progressing  Goal: Turns in bed with improved pain control throughout the shift  Outcome: Progressing  Goal: Walks with improved pain control throughout the shift  Outcome: Progressing  Goal: Performs ADL's with improved pain control throughout shift  Outcome: Progressing     Problem: Obstructive: Kidney Stones, Hydronephrosis, BPH  Goal: Achieves normovolemia  Outcome: Progressing  Goal: Relieve obstruction  Outcome: Progressing   The patient's goals for the shift include sleep    The clinical goals for the shift include pt will remain free from fall/injury

## 2025-01-02 ENCOUNTER — HOSPITAL ENCOUNTER (OUTPATIENT)
Dept: HOSPITAL 101 - ER | Age: 70
Setting detail: OBSERVATION
LOS: 1 days | Discharge: NURSING, REHAB OR LONG-TERM CARE | End: 2025-01-03
Payer: MEDICARE

## 2025-01-02 VITALS — OXYGEN SATURATION: 96 % | HEART RATE: 72 BPM

## 2025-01-02 VITALS
DIASTOLIC BLOOD PRESSURE: 76 MMHG | SYSTOLIC BLOOD PRESSURE: 124 MMHG | OXYGEN SATURATION: 92 % | TEMPERATURE: 98.06 F | HEART RATE: 88 BPM

## 2025-01-02 VITALS
SYSTOLIC BLOOD PRESSURE: 125 MMHG | DIASTOLIC BLOOD PRESSURE: 84 MMHG | OXYGEN SATURATION: 98 % | TEMPERATURE: 97.7 F | HEART RATE: 91 BPM

## 2025-01-02 VITALS
TEMPERATURE: 97.9 F | SYSTOLIC BLOOD PRESSURE: 129 MMHG | HEART RATE: 85 BPM | OXYGEN SATURATION: 95 % | DIASTOLIC BLOOD PRESSURE: 80 MMHG

## 2025-01-02 VITALS — OXYGEN SATURATION: 96 %

## 2025-01-02 VITALS — HEART RATE: 89 BPM | DIASTOLIC BLOOD PRESSURE: 71 MMHG | SYSTOLIC BLOOD PRESSURE: 130 MMHG | OXYGEN SATURATION: 96 %

## 2025-01-02 VITALS — HEART RATE: 70 BPM | OXYGEN SATURATION: 97 %

## 2025-01-02 VITALS
SYSTOLIC BLOOD PRESSURE: 121 MMHG | HEART RATE: 78 BPM | TEMPERATURE: 97.6 F | DIASTOLIC BLOOD PRESSURE: 75 MMHG | OXYGEN SATURATION: 96 %

## 2025-01-02 VITALS — BODY MASS INDEX: 28.4 KG/M2 | BODY MASS INDEX: 27 KG/M2

## 2025-01-02 VITALS — OXYGEN SATURATION: 97 %

## 2025-01-02 DIAGNOSIS — E11.22: ICD-10-CM

## 2025-01-02 DIAGNOSIS — I12.9: ICD-10-CM

## 2025-01-02 DIAGNOSIS — Z87.891: ICD-10-CM

## 2025-01-02 DIAGNOSIS — K59.00: ICD-10-CM

## 2025-01-02 DIAGNOSIS — R10.9: ICD-10-CM

## 2025-01-02 DIAGNOSIS — Z79.82: ICD-10-CM

## 2025-01-02 DIAGNOSIS — Z96.652: ICD-10-CM

## 2025-01-02 DIAGNOSIS — Z97.8: ICD-10-CM

## 2025-01-02 DIAGNOSIS — R33.9: ICD-10-CM

## 2025-01-02 DIAGNOSIS — R31.0: Primary | ICD-10-CM

## 2025-01-02 DIAGNOSIS — N32.89: ICD-10-CM

## 2025-01-02 DIAGNOSIS — Z79.01: ICD-10-CM

## 2025-01-02 DIAGNOSIS — E78.00: ICD-10-CM

## 2025-01-02 DIAGNOSIS — E83.42: ICD-10-CM

## 2025-01-02 DIAGNOSIS — Z79.4: ICD-10-CM

## 2025-01-02 DIAGNOSIS — N18.9: ICD-10-CM

## 2025-01-02 DIAGNOSIS — N40.1: ICD-10-CM

## 2025-01-02 DIAGNOSIS — Z79.899: ICD-10-CM

## 2025-01-02 DIAGNOSIS — G47.00: ICD-10-CM

## 2025-01-02 DIAGNOSIS — I69.354: ICD-10-CM

## 2025-01-02 DIAGNOSIS — D64.9: ICD-10-CM

## 2025-01-02 LAB
ADD MANUAL DIFF: NO
ALANINE AMINOTRANSFERASE: 21 U/L (ref 16–63)
ALBUMIN GLOBULIN RATIO: 1.3
ALBUMIN LEVEL: 3.6 G/DL (ref 3.4–5)
ALKALINE PHOSPHATASE: 68 U/L (ref 46–116)
AMYLASE: 25 U/L (ref 25–115)
ANION GAP: 13.2
ASPARTATE AMINO TRANSFERASE: 15 U/L (ref 15–37)
BLOOD UREA NITROGEN: 20 MG/DL (ref 7–18)
CALCIUM: 8.5 MG/DL (ref 8.5–10.1)
CARBON DIOXIDE: 26.4 MMOL/L (ref 21–32)
CAST SEEN?: (no result) #/LPF
CHLORIDE: 100 MMOL/L (ref 98–107)
CO2 BLD-SCNC: 26.4 MMOL/L (ref 21–32)
ESTIMATED GFR (AFRICAN AMERICA: >60
ESTIMATED GFR (NON-AFRICAN AME: >60
GLOBULIN: 2.7 G/DL
GLUCOSE BLD-MCNC: 163 MG/DL (ref 74–106)
GLUCOSE URINE UA: NEGATIVE MG/DL
HCT VFR BLD CALC: 31.9 % (ref 42–54)
HEMATOCRIT: 31.9 % (ref 42–54)
HEMOGLOBIN: 10.6 G/DL (ref 14–18)
IMMATURE GRANULOCYTES ABS AUTO: 0.02 10^3/UL (ref 0–0.03)
IMMATURE GRANULOCYTES PCT AUTO: 0.2 % (ref 0–0.5)
INR: 1.12
LIPASE: 35 U/L (ref 16–77)
LYMPHOCYTES  ABSOLUTE AUTO: 1 10^3/UL (ref 1.2–3.8)
MCV RBC: 80.6 FL (ref 80–94)
MEAN CORPUSCULAR HEMOGLOBIN: 26.8 PG (ref 25.9–34)
MEAN CORPUSCULAR HGB CONC: 33.2 G/DL (ref 29.9–35.2)
MEAN CORPUSCULAR VOLUME: 80.6 FL (ref 80–94)
PARTIAL THROMBOPLASTIN TIME: 23.4 SEC (ref 22.3–36.2)
PLATELET # BLD: 251 10^3/UL (ref 150–450)
PLATELET COUNT: 251 10^3/UL (ref 150–450)
POTASSIUM SERPLBLD-SCNC: 3.6 MMOL/L (ref 3.5–5.1)
POTASSIUM: 3.6 MMOL/L (ref 3.5–5.1)
PROTHROMBIN TIME: 11.7 SEC (ref 9–11.6)
RED BLOOD COUNT: 3.96 10^6/UL (ref 4.7–6.1)
SODIUM BLD-SCNC: 136 MMOL/L (ref 136–145)
SODIUM: 136 MMOL/L (ref 136–145)
TOTAL PROTEIN: 6.3 G/DL (ref 6.4–8.2)
URINE CULTURE INDICATED: YES
WBC # BLD: 10.1 10^3/UL (ref 4–11)
WHITE BLOOD COUNT: 10.1 10^3/UL (ref 4–11)

## 2025-01-02 PROCEDURE — 85730 THROMBOPLASTIN TIME PARTIAL: CPT

## 2025-01-02 PROCEDURE — 87150 DNA/RNA AMPLIFIED PROBE: CPT

## 2025-01-02 PROCEDURE — 99285 EMERGENCY DEPT VISIT HI MDM: CPT

## 2025-01-02 PROCEDURE — 76857 US EXAM PELVIC LIMITED: CPT

## 2025-01-02 PROCEDURE — 80076 HEPATIC FUNCTION PANEL: CPT

## 2025-01-02 PROCEDURE — 80048 BASIC METABOLIC PNL TOTAL CA: CPT

## 2025-01-02 PROCEDURE — 51702 INSERT TEMP BLADDER CATH: CPT

## 2025-01-02 PROCEDURE — 87186 SC STD MICRODIL/AGAR DIL: CPT

## 2025-01-02 PROCEDURE — 85025 COMPLETE CBC W/AUTO DIFF WBC: CPT

## 2025-01-02 PROCEDURE — 36415 COLL VENOUS BLD VENIPUNCTURE: CPT

## 2025-01-02 PROCEDURE — 83690 ASSAY OF LIPASE: CPT

## 2025-01-02 PROCEDURE — 94667 MNPJ CHEST WALL 1ST: CPT

## 2025-01-02 PROCEDURE — 82948 REAGENT STRIP/BLOOD GLUCOSE: CPT

## 2025-01-02 PROCEDURE — 82150 ASSAY OF AMYLASE: CPT

## 2025-01-02 PROCEDURE — 85610 PROTHROMBIN TIME: CPT

## 2025-01-02 PROCEDURE — 94761 N-INVAS EAR/PLS OXIMETRY MLT: CPT

## 2025-01-02 PROCEDURE — 51798 US URINE CAPACITY MEASURE: CPT

## 2025-01-02 PROCEDURE — 87086 URINE CULTURE/COLONY COUNT: CPT

## 2025-01-02 PROCEDURE — 81001 URINALYSIS AUTO W/SCOPE: CPT

## 2025-01-02 PROCEDURE — 94668 MNPJ CHEST WALL SBSQ: CPT

## 2025-01-02 PROCEDURE — G0378 HOSPITAL OBSERVATION PER HR: HCPCS

## 2025-01-02 RX ADMIN — SODIUM CHLORIDE 3000 ML: 900 IRRIGANT IRRIGATION at 16:43

## 2025-01-02 RX ADMIN — INSULIN GLARGINE 15 UNIT: 100 INJECTION, SOLUTION SUBCUTANEOUS at 21:29

## 2025-01-02 RX ADMIN — SODIUM CHLORIDE 3000 ML: 900 IRRIGANT IRRIGATION at 10:56

## 2025-01-02 RX ADMIN — ACETAMINOPHEN 1000 MG: 500 TABLET ORAL at 18:19

## 2025-01-02 RX ADMIN — TRAZODONE HYDROCHLORIDE 25 MG: 50 TABLET ORAL at 21:29

## 2025-01-02 RX ADMIN — WATER 3000 ML: 1 IRRIGANT IRRIGATION at 07:30

## 2025-01-02 RX ADMIN — WATER 3000 ML: 1 IRRIGANT IRRIGATION at 08:27

## 2025-01-02 RX ADMIN — INSULIN ASPART 0 UNIT: 100 INJECTION, SOLUTION INTRAVENOUS; SUBCUTANEOUS at 21:29

## 2025-01-02 RX ADMIN — FINASTERIDE 5 MG: 5 TABLET, FILM COATED ORAL at 15:50

## 2025-01-02 RX ADMIN — SODIUM CHLORIDE 3000 ML: 900 IRRIGANT IRRIGATION at 11:38

## 2025-01-02 RX ADMIN — SODIUM CHLORIDE 3000 ML: 900 IRRIGANT IRRIGATION at 10:03

## 2025-01-02 RX ADMIN — SODIUM CHLORIDE 3000 ML: 900 IRRIGANT IRRIGATION at 12:35

## 2025-01-02 RX ADMIN — SODIUM CHLORIDE 3000 ML: 900 IRRIGANT IRRIGATION at 10:18

## 2025-01-02 RX ADMIN — LEVOFLOXACIN 100 MG: 750 INJECTION, SOLUTION INTRAVENOUS at 13:13

## 2025-01-02 RX ADMIN — WATER 3000 ML: 1 IRRIGANT IRRIGATION at 06:37

## 2025-01-02 RX ADMIN — TAMSULOSIN HYDROCHLORIDE 0.4 MG: 0.4 CAPSULE ORAL at 21:28

## 2025-01-02 RX ADMIN — SODIUM CHLORIDE 3000 ML: 900 IRRIGANT IRRIGATION at 14:59

## 2025-01-02 RX ADMIN — SODIUM CHLORIDE 3000 ML: 900 IRRIGANT IRRIGATION at 14:01

## 2025-01-02 RX ADMIN — GABAPENTIN 100 MG: 100 CAPSULE ORAL at 21:28

## 2025-01-03 VITALS
OXYGEN SATURATION: 95 % | DIASTOLIC BLOOD PRESSURE: 66 MMHG | HEART RATE: 85 BPM | TEMPERATURE: 97.7 F | SYSTOLIC BLOOD PRESSURE: 115 MMHG

## 2025-01-03 VITALS — OXYGEN SATURATION: 94 %

## 2025-01-03 VITALS
OXYGEN SATURATION: 93 % | SYSTOLIC BLOOD PRESSURE: 109 MMHG | HEART RATE: 109 BPM | DIASTOLIC BLOOD PRESSURE: 57 MMHG | TEMPERATURE: 97.3 F

## 2025-01-03 VITALS — OXYGEN SATURATION: 93 %

## 2025-01-03 LAB
ADD MANUAL DIFF: NO
ANION GAP: 9.7
BLOOD UREA NITROGEN: 18 MG/DL (ref 7–18)
BOX TEST REFERENCE LAB: (no result)
CALCIUM: 8.6 MG/DL (ref 8.5–10.1)
CARBON DIOXIDE: 28 MMOL/L (ref 21–32)
CHLORIDE: 102 MMOL/L (ref 98–107)
CO2 BLD-SCNC: 28 MMOL/L (ref 21–32)
ESTIMATED GFR (AFRICAN AMERICA: >60
ESTIMATED GFR (NON-AFRICAN AME: >60
GLUCOSE BLD-MCNC: 89 MG/DL (ref 74–106)
HCT VFR BLD CALC: 26.4 % (ref 42–54)
HEMATOCRIT: 26.4 % (ref 42–54)
HEMOGLOBIN: 8.6 G/DL (ref 14–18)
IMMATURE GRANULOCYTES ABS AUTO: 0.02 10^3/UL (ref 0–0.03)
IMMATURE GRANULOCYTES PCT AUTO: 0.4 % (ref 0–0.5)
LYMPHOCYTES  ABSOLUTE AUTO: 1.4 10^3/UL (ref 1.2–3.8)
MCV RBC: 81.5 FL (ref 80–94)
MEAN CORPUSCULAR HEMOGLOBIN: 26.5 PG (ref 25.9–34)
MEAN CORPUSCULAR HGB CONC: 32.6 G/DL (ref 29.9–35.2)
MEAN CORPUSCULAR VOLUME: 81.5 FL (ref 80–94)
PLATELET # BLD: 217 10^3/UL (ref 150–450)
PLATELET COUNT: 217 10^3/UL (ref 150–450)
POTASSIUM SERPLBLD-SCNC: 3.7 MMOL/L (ref 3.5–5.1)
POTASSIUM: 3.7 MMOL/L (ref 3.5–5.1)
RED BLOOD COUNT: 3.24 10^6/UL (ref 4.7–6.1)
SODIUM BLD-SCNC: 136 MMOL/L (ref 136–145)
SODIUM: 136 MMOL/L (ref 136–145)
WBC # BLD: 5.7 10^3/UL (ref 4–11)
WHITE BLOOD COUNT: 5.7 10^3/UL (ref 4–11)

## 2025-01-03 RX ADMIN — GABAPENTIN 100 MG: 100 CAPSULE ORAL at 13:57

## 2025-01-03 RX ADMIN — ACETAMINOPHEN 1000 MG: 500 TABLET ORAL at 04:46

## 2025-01-03 RX ADMIN — METOPROLOL SUCCINATE 50 MG: 50 TABLET, EXTENDED RELEASE ORAL at 09:24

## 2025-01-03 RX ADMIN — SODIUM CHLORIDE 3000 ML: 900 IRRIGANT IRRIGATION at 04:47

## 2025-01-03 RX ADMIN — LEVOFLOXACIN 100 MG: 750 INJECTION, SOLUTION INTRAVENOUS at 12:05

## 2025-01-04 ENCOUNTER — HOSPITAL ENCOUNTER (OUTPATIENT)
Dept: HOSPITAL 101 - LAB | Age: 70
Discharge: HOME | End: 2025-01-04
Payer: MEDICARE

## 2025-01-04 DIAGNOSIS — I63.511: ICD-10-CM

## 2025-01-04 DIAGNOSIS — M17.11: ICD-10-CM

## 2025-01-04 DIAGNOSIS — N39.0: ICD-10-CM

## 2025-01-04 DIAGNOSIS — I69.30: ICD-10-CM

## 2025-01-04 DIAGNOSIS — H53.8: ICD-10-CM

## 2025-01-04 DIAGNOSIS — R26.81: ICD-10-CM

## 2025-01-04 DIAGNOSIS — D64.9: ICD-10-CM

## 2025-01-04 DIAGNOSIS — R41.82: ICD-10-CM

## 2025-01-04 DIAGNOSIS — E78.1: ICD-10-CM

## 2025-01-04 DIAGNOSIS — N17.9: Primary | ICD-10-CM

## 2025-01-04 DIAGNOSIS — E11.36: ICD-10-CM

## 2025-01-04 DIAGNOSIS — R53.1: ICD-10-CM

## 2025-01-04 DIAGNOSIS — N18.31: ICD-10-CM

## 2025-01-04 LAB
ADD MANUAL DIFF: NO
HCT VFR BLD CALC: 24.6 % (ref 42–54)
HEMATOCRIT: 24.6 % (ref 42–54)
HEMOGLOBIN: 7.8 G/DL (ref 14–18)
IMMATURE GRANULOCYTES ABS AUTO: 0 10^3/UL (ref 0–0.03)
IMMATURE GRANULOCYTES PCT AUTO: 0 % (ref 0–0.5)
LYMPHOCYTES  ABSOLUTE AUTO: 1 10^3/UL (ref 1.2–3.8)
MCV RBC: 82.8 FL (ref 80–94)
MEAN CORPUSCULAR HEMOGLOBIN: 26.3 PG (ref 25.9–34)
MEAN CORPUSCULAR HGB CONC: 31.7 G/DL (ref 29.9–35.2)
MEAN CORPUSCULAR VOLUME: 82.8 FL (ref 80–94)
PLATELET # BLD: 236 10^3/UL (ref 150–450)
PLATELET COUNT: 236 10^3/UL (ref 150–450)
RED BLOOD COUNT: 2.97 10^6/UL (ref 4.7–6.1)
WBC # BLD: 5.3 10^3/UL (ref 4–11)
WHITE BLOOD COUNT: 5.3 10^3/UL (ref 4–11)

## 2025-01-04 PROCEDURE — 36415 COLL VENOUS BLD VENIPUNCTURE: CPT

## 2025-01-04 PROCEDURE — 85025 COMPLETE CBC W/AUTO DIFF WBC: CPT

## 2025-01-05 LAB — BOX TEST RESULT: (no result)

## 2025-01-06 ENCOUNTER — HOSPITAL ENCOUNTER
Dept: HOSPITAL 101 - LAB | Age: 70
Discharge: HOME | End: 2025-01-06
Payer: MEDICARE

## 2025-01-06 DIAGNOSIS — D64.9: ICD-10-CM

## 2025-01-06 DIAGNOSIS — R31.9: Primary | ICD-10-CM

## 2025-01-06 LAB
ADD MANUAL DIFF: NO
HCT VFR BLD CALC: 29.2 % (ref 42–54)
HEMATOCRIT: 29.2 % (ref 42–54)
HEMOGLOBIN: 9.1 G/DL (ref 14–18)
IMMATURE GRANULOCYTES ABS AUTO: 0.01 10^3/UL (ref 0–0.03)
IMMATURE GRANULOCYTES PCT AUTO: 0.2 % (ref 0–0.5)
LYMPHOCYTES  ABSOLUTE AUTO: 1.1 10^3/UL (ref 1.2–3.8)
MCV RBC: 81.8 FL (ref 80–94)
MEAN CORPUSCULAR HEMOGLOBIN: 25.5 PG (ref 25.9–34)
MEAN CORPUSCULAR HGB CONC: 31.2 G/DL (ref 29.9–35.2)
MEAN CORPUSCULAR VOLUME: 81.8 FL (ref 80–94)
PLATELET # BLD: 265 10^3/UL (ref 150–450)
PLATELET COUNT: 265 10^3/UL (ref 150–450)
RED BLOOD COUNT: 3.57 10^6/UL (ref 4.7–6.1)
WBC # BLD: 4.3 10^3/UL (ref 4–11)
WHITE BLOOD COUNT: 4.3 10^3/UL (ref 4–11)

## 2025-01-06 PROCEDURE — 86850 RBC ANTIBODY SCREEN: CPT

## 2025-01-06 PROCEDURE — 86900 BLOOD TYPING SEROLOGIC ABO: CPT

## 2025-01-06 PROCEDURE — 36415 COLL VENOUS BLD VENIPUNCTURE: CPT

## 2025-01-06 PROCEDURE — 85025 COMPLETE CBC W/AUTO DIFF WBC: CPT

## 2025-01-06 PROCEDURE — 86901 BLOOD TYPING SEROLOGIC RH(D): CPT

## 2025-01-13 NOTE — DISCHARGE SUMMARY
Discharge Diagnosis  Hematuria, chronic urinary retention, s/p Franco, hyponatremia, constipation    Issues Requiring Follow-Up  Hematuria, chronic urinary retention, s/p Franco, hyponatremia, constipation     Discharge Meds     Medication List      START taking these medications     * nicotine 21 mg/24 hr patch; Commonly known as: Nicoderm CQ; Place 1   patch over 24 hours on the skin once daily for 38 doses.   * nicotine 14 mg/24 hr patch; Commonly known as: Nicoderm CQ; Place 1   patch over 24 hours on the skin once daily for 14 doses. Do not fill   before January 18, 2025.; Start taking on: January 18, 2025   * nicotine 7 mg/24 hr patch; Commonly known as: Nicoderm CQ; Place 1   patch over 24 hours on the skin once daily for 14 doses. Do not fill   before February 1, 2025.; Start taking on: February 1, 2025  * This list has 3 medication(s) that are the same as other medications   prescribed for you. Read the directions carefully, and ask your doctor or   other care provider to review them with you.     CONTINUE taking these medications     aspirin 81 mg EC tablet   atorvastatin 40 mg tablet; Commonly known as: Lipitor   gabapentin 100 mg capsule; Commonly known as: Neurontin   insulin lispro 100 unit/mL injection   Lantus U-100 Insulin 100 unit/mL injection; Generic drug: insulin   glargine   magnesium hydroxide 400 mg/5 mL suspension; Commonly known as: Milk of   Magnesia   magnesium oxide 400 mg tablet; Commonly known as: Mag-Ox   Melatin 3 mg tablet; Generic drug: melatonin   metoprolol succinate XL 50 mg 24 hr tablet; Commonly known as: Toprol-XL   pantoprazole 40 mg EC tablet; Commonly known as: ProtoNix   polyethylene glycol 17 gram packet; Commonly known as: Glycolax, Miralax   sennosides 8.6 mg tablet; Commonly known as: Senokot   sennosides-docusate sodium 8.6-50 mg tablet; Commonly known as:   Carmen-Colace   tamsulosin 0.4 mg 24 hr capsule; Commonly known as: Flomax   ticagrelor 90 mg tablet; Commonly  known as: Brilinta   traZODone 50 mg tablet; Commonly known as: Desyrel     STOP taking these medications     cefuroxime 500 mg tablet; Commonly known as: Ceftin       Test Results Pending At Discharge  Pending Labs       No current pending labs.            Hospital Course   Pt is a 69 y.o. M from [facility] with PMH of HTN, HLD, T2DM, CVA, BPH, chronic indwelling Franco catheter due to urinary retention presented to ER in [facility] due to hematuria.  Patient was apparently in the ER few days ago with a similar complaint and was sent back to [facility] from where he came back to ER again..  Patient apparently was on Brilinta for his previous stroke which was recently DC'd due to hematuria.  Patient was evaluated by urology, treated conservatively, was DC'd and is relatively stable condition for further care and follow-up as an outpatient.    Pertinent Physical Exam At Time of Discharge  Physical Exam  HENT:      Head: Normocephalic.   Eyes:      Conjunctiva/sclera: Conjunctivae normal.   Cardiovascular:      Rate and Rhythm: Regular rhythm.   Pulmonary:      Breath sounds: Normal breath sounds.   Abdominal:      General: Bowel sounds are normal.      Palpations: Abdomen is soft.   Musculoskeletal:         General: Normal range of motion.   Skin:     General: Skin is warm and dry.   Neurological:      General: No focal deficit present.      Mental Status: He is alert.   Psychiatric:         Behavior: Behavior normal.         Outpatient Follow-Up  Future Appointments   Date Time Provider Department Center   2/5/2025  1:40 PM Timothy Luna MD [id number]         Salvador Biswas MD

## 2025-02-06 ENCOUNTER — HOSPITAL ENCOUNTER
Dept: HOSPITAL 101 - MRI | Age: 70
Discharge: HOME | End: 2025-02-06
Payer: MEDICARE

## 2025-02-06 DIAGNOSIS — K82.4: Primary | ICD-10-CM

## 2025-02-06 DIAGNOSIS — N28.1: ICD-10-CM

## 2025-02-06 PROCEDURE — 74181 MRI ABDOMEN W/O CONTRAST: CPT

## 2025-06-26 ENCOUNTER — HOSPITAL ENCOUNTER (EMERGENCY)
Age: 70
Discharge: HOME | End: 2025-06-26
Payer: MEDICARE

## 2025-06-26 VITALS
DIASTOLIC BLOOD PRESSURE: 88 MMHG | HEART RATE: 70 BPM | TEMPERATURE: 98.78 F | SYSTOLIC BLOOD PRESSURE: 167 MMHG | OXYGEN SATURATION: 98 %

## 2025-06-26 VITALS — OXYGEN SATURATION: 98 %

## 2025-06-26 VITALS — SYSTOLIC BLOOD PRESSURE: 140 MMHG | DIASTOLIC BLOOD PRESSURE: 66 MMHG | HEART RATE: 67 BPM | OXYGEN SATURATION: 96 %

## 2025-06-26 VITALS — BODY MASS INDEX: 26.8 KG/M2

## 2025-06-26 DIAGNOSIS — Z96.652: ICD-10-CM
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
## 2025-06-26 DIAGNOSIS — Z87.891: ICD-10-CM

## 2025-06-26 DIAGNOSIS — S00.81XA: ICD-10-CM

## 2025-06-26 DIAGNOSIS — I69.354: ICD-10-CM

## 2025-06-26 DIAGNOSIS — Z23: ICD-10-CM

## 2025-06-26 DIAGNOSIS — S00.83XA: Primary | ICD-10-CM

## 2025-06-26 PROCEDURE — 71045 X-RAY EXAM CHEST 1 VIEW: CPT

## 2025-06-26 PROCEDURE — 70450 CT HEAD/BRAIN W/O DYE: CPT

## 2025-06-26 PROCEDURE — 90471 IMMUNIZATION ADMIN: CPT

## 2025-06-26 PROCEDURE — 90715 TDAP VACCINE 7 YRS/> IM: CPT

## 2025-06-26 PROCEDURE — 72125 CT NECK SPINE W/O DYE: CPT

## 2025-06-26 PROCEDURE — 99285 EMERGENCY DEPT VISIT HI MDM: CPT

## 2025-06-26 PROCEDURE — 73502 X-RAY EXAM HIP UNI 2-3 VIEWS: CPT

## 2025-07-05 NOTE — DOCUMENTATION CLARIFICATION NOTE
"    PATIENT:               ALLA MORELOS  ACCT #:                  [id number]  MRN:                       [id number]  :                       1955  ADMIT DATE:       2024 5:12 AM  DISCH DATE:  RESPONDING PROVIDER #:        [id number]          PROVIDER RESPONSE TEXT:    Bleeding/Gross Hematuria due to or enhanced by Brilinta, Aspirin use    CDI QUERY TEXT:    Clarification    Instruction:    Based on your assessment of the patient and the clinical information, please provide the requested documentation by clicking on the appropriate radio button and enter any additional information if prompted.    Question: Please further clarify if a relationship exists between hematuria and Brilinta, Aspirin use    When answering this query, please exercise your independent professional judgment. The fact that a question is being asked, does not imply that any particular answer is desired or expected.    The patient's clinical indicators include:  Clinical Information:  69 yr old male presenting as transfer from OSH due to gross hematuria, clots in presence of BPH w/urinary retention, chronic indwelling baldwin catheter.    Clinical findings:  Hgb: 10.8, 10.6, 10.6. 10.5, 11.0 from -12/10.    HP by IM 15895 AM states \"Hematuria\", \"Chronic indwelling Baldwin catheter\", \"plan to hold anticoagulation such as aspirin/brilinta until cleared with urology\".    Treatment: Lab monitoring of H/H, PT/INR.  Brilinta, Aspirin held.  CT Abd/Pelvis.  US RUQ.  MRI Abd.  Urology consult.    Risk factors: Elderly male w/hx of Anemia, previous Stroke on Brilinta, Aspirin.  Options provided:  -- Bleeding/Gross Hematuria due to or enhanced by Brilinta, Aspirin use  -- Bleeding/Gross Hematuria not due to or enhanced by Brilinta, Aspirin use  -- Other - I will add my own diagnosis  -- Refer to Clinical Documentation Reviewer    Query created by: Zeina Earl on 12/10/2024 10:06 AM      Electronically signed by:  HANNAH CHAWLA MD " 12/10/2024 10:44 AM           oriented to person, place and time